# Patient Record
Sex: FEMALE | Race: WHITE | NOT HISPANIC OR LATINO | Employment: OTHER | URBAN - NONMETROPOLITAN AREA
[De-identification: names, ages, dates, MRNs, and addresses within clinical notes are randomized per-mention and may not be internally consistent; named-entity substitution may affect disease eponyms.]

---

## 2018-01-03 ENCOUNTER — APPOINTMENT (OUTPATIENT)
Dept: PHYSICAL THERAPY | Facility: CLINIC | Age: 68
End: 2018-01-03
Payer: COMMERCIAL

## 2018-01-03 PROCEDURE — 97010 HOT OR COLD PACKS THERAPY: CPT

## 2018-01-03 PROCEDURE — 97535 SELF CARE MNGMENT TRAINING: CPT

## 2018-01-03 PROCEDURE — 97140 MANUAL THERAPY 1/> REGIONS: CPT

## 2018-01-03 PROCEDURE — G8978 MOBILITY CURRENT STATUS: HCPCS

## 2018-01-03 PROCEDURE — G8979 MOBILITY GOAL STATUS: HCPCS

## 2018-01-03 PROCEDURE — 97161 PT EVAL LOW COMPLEX 20 MIN: CPT

## 2018-01-03 PROCEDURE — 97110 THERAPEUTIC EXERCISES: CPT

## 2018-01-04 ENCOUNTER — APPOINTMENT (OUTPATIENT)
Dept: PHYSICAL THERAPY | Facility: CLINIC | Age: 68
End: 2018-01-04
Payer: COMMERCIAL

## 2018-01-08 ENCOUNTER — APPOINTMENT (OUTPATIENT)
Dept: PHYSICAL THERAPY | Facility: CLINIC | Age: 68
End: 2018-01-08
Payer: COMMERCIAL

## 2018-01-08 PROCEDURE — 97112 NEUROMUSCULAR REEDUCATION: CPT

## 2018-01-08 PROCEDURE — 97530 THERAPEUTIC ACTIVITIES: CPT

## 2018-01-08 PROCEDURE — 97010 HOT OR COLD PACKS THERAPY: CPT

## 2018-01-08 PROCEDURE — 97140 MANUAL THERAPY 1/> REGIONS: CPT

## 2018-01-08 PROCEDURE — 97110 THERAPEUTIC EXERCISES: CPT

## 2018-01-10 ENCOUNTER — APPOINTMENT (OUTPATIENT)
Dept: PHYSICAL THERAPY | Facility: CLINIC | Age: 68
End: 2018-01-10
Payer: COMMERCIAL

## 2018-01-10 PROCEDURE — 97140 MANUAL THERAPY 1/> REGIONS: CPT

## 2018-01-10 PROCEDURE — 97110 THERAPEUTIC EXERCISES: CPT

## 2018-01-11 ENCOUNTER — APPOINTMENT (OUTPATIENT)
Dept: PHYSICAL THERAPY | Facility: CLINIC | Age: 68
End: 2018-01-11
Payer: COMMERCIAL

## 2018-01-11 PROCEDURE — 97140 MANUAL THERAPY 1/> REGIONS: CPT

## 2018-01-11 PROCEDURE — 97110 THERAPEUTIC EXERCISES: CPT

## 2018-01-15 ENCOUNTER — APPOINTMENT (OUTPATIENT)
Dept: PHYSICAL THERAPY | Facility: CLINIC | Age: 68
End: 2018-01-15
Payer: COMMERCIAL

## 2018-01-15 PROCEDURE — 97140 MANUAL THERAPY 1/> REGIONS: CPT

## 2018-01-15 PROCEDURE — 97110 THERAPEUTIC EXERCISES: CPT

## 2018-01-17 ENCOUNTER — APPOINTMENT (OUTPATIENT)
Dept: PHYSICAL THERAPY | Facility: CLINIC | Age: 68
End: 2018-01-17
Payer: COMMERCIAL

## 2018-01-19 ENCOUNTER — APPOINTMENT (OUTPATIENT)
Dept: PHYSICAL THERAPY | Facility: CLINIC | Age: 68
End: 2018-01-19
Payer: COMMERCIAL

## 2018-01-19 PROCEDURE — 97110 THERAPEUTIC EXERCISES: CPT

## 2018-01-19 PROCEDURE — 97140 MANUAL THERAPY 1/> REGIONS: CPT

## 2018-01-22 ENCOUNTER — APPOINTMENT (OUTPATIENT)
Dept: PHYSICAL THERAPY | Facility: CLINIC | Age: 68
End: 2018-01-22
Payer: COMMERCIAL

## 2018-01-22 PROCEDURE — 97140 MANUAL THERAPY 1/> REGIONS: CPT

## 2018-01-22 PROCEDURE — 97110 THERAPEUTIC EXERCISES: CPT

## 2018-01-24 ENCOUNTER — OFFICE VISIT (OUTPATIENT)
Dept: PHYSICAL THERAPY | Facility: CLINIC | Age: 68
End: 2018-01-24
Payer: COMMERCIAL

## 2018-01-24 DIAGNOSIS — Z96.651 PRESENCE OF RIGHT ARTIFICIAL KNEE JOINT: Primary | ICD-10-CM

## 2018-01-24 PROCEDURE — 97010 HOT OR COLD PACKS THERAPY: CPT

## 2018-01-24 PROCEDURE — 97140 MANUAL THERAPY 1/> REGIONS: CPT

## 2018-01-24 PROCEDURE — 97110 THERAPEUTIC EXERCISES: CPT

## 2018-01-24 NOTE — PROGRESS NOTES
Daily Note     Today's date: 2018  Patient name: Searfin Lindsay  : 1950  MRN: [id number]  Referring provider: Dominic Sosa  Dx:   Encounter Diagnosis   Name Primary?  Presence of right artificial knee joint Yes                  Subjective: Pt reports difficulty donning/doffing socks and shoes  Reports increased pain with attempting to put on shoes and socks  Objective: See treatment diary below      Assessment: Tolerated treatment fair  Patient demonstrated fatigue post treatment and could benefit from continued PT  Pt zenia added sit to stand well with use of B Ue's Pt cont to have notable restriction with rom into kneel flx/ext  Plan: Continue per plan of care       Precautions: 17 date of surgery    Daily Treatment Diary    HEP: Sheet provided and discussed    Manual              ART             IASTM             JM             PROM and LE stretch 15'            STM               Exercise Diary              TM             Bike 10'            NuStep             Standing 1/2 Roll calf stretch ''            Ankle Pumps 3/10            HR/TR 3/10            TB TKE L3 3/10            TB Heel to Toes L2 3/10            Forward/backward heel to toe walk 3/10            Sit to stand 2/10            No shoe AE Steamboats             Bridge with Add squeeze             HS into QS 3/10            Clam shells             SL Sit to Stand             BOSU SLB             Upside down BOSU SL squat holds             TB Lat Walks 3/10            Step ups/downs             Wall Slides 3/10                Modalities              MHP             CP             Stim

## 2018-01-26 ENCOUNTER — OFFICE VISIT (OUTPATIENT)
Dept: PHYSICAL THERAPY | Facility: CLINIC | Age: 68
End: 2018-01-26
Payer: COMMERCIAL

## 2018-01-26 DIAGNOSIS — Z96.651 PRESENCE OF RIGHT ARTIFICIAL KNEE JOINT: Primary | ICD-10-CM

## 2018-01-26 PROCEDURE — 97110 THERAPEUTIC EXERCISES: CPT

## 2018-01-26 PROCEDURE — 97140 MANUAL THERAPY 1/> REGIONS: CPT

## 2018-01-26 NOTE — PROGRESS NOTES
Daily Note     Today's date: 2018  Patient name: Betsey Dominguez  : 1950  MRN: [id number]  Referring provider: Silvestre Carrasco  Dx:   Encounter Diagnosis   Name Primary?  Presence of right artificial knee joint Yes                  Subjective: Pt reports overall improved sx today since beginning of the week  Objective: See treatment diary below      Assessment: Tolerated treatment fair  Patient demonstrated fatigue post treatment  Pt cont to be restricted with knee flx/ext  Pt making slow steady gains toward improved rom however will cont to benefit from tx to address cont rom restrictions  Plan: Continue per plan of care  RC due next week       Precautions: 17 date of surgery     Daily Treatment Diary     HEP: Sheet provided and discussed     Manual   18                     ART                       IASTM                       JM                       PROM and LE stretch 15'                     STM                          Exercise Diary                        TM                       Bike 10'                     NuStep                       Standing 1/2 Roll calf stretch ''                     Ankle Pumps 3/10                     HR/TR 3/10                     TB TKE L3 3/10                     TB Heel to Toes L2 3/10                     Forward/backward heel to toe walk 3/10                     Sit to stand 2/10                     No shoe AE Steamboats                       Bridge with Add squeeze                       HS into QS 3/10                     Clam shells                       SL Sit to Stand                       BOSU SLB                       Upside down BOSU SL squat holds                       TB Lat Walks 3/10                     Step ups/downs                       Wall Slides 3/10                           Modalities                        MHP                       CP  10'                     Stim

## 2018-01-29 ENCOUNTER — OFFICE VISIT (OUTPATIENT)
Dept: PHYSICAL THERAPY | Facility: CLINIC | Age: 68
End: 2018-01-29
Payer: COMMERCIAL

## 2018-01-29 DIAGNOSIS — Z96.651 PRESENCE OF RIGHT ARTIFICIAL KNEE JOINT: Primary | ICD-10-CM

## 2018-01-29 PROCEDURE — G8978 MOBILITY CURRENT STATUS: HCPCS

## 2018-01-29 PROCEDURE — 97140 MANUAL THERAPY 1/> REGIONS: CPT

## 2018-01-29 PROCEDURE — G8978 MOBILITY CURRENT STATUS: HCPCS | Performed by: PHYSICAL THERAPIST

## 2018-01-29 PROCEDURE — G8979 MOBILITY GOAL STATUS: HCPCS | Performed by: PHYSICAL THERAPIST

## 2018-01-29 PROCEDURE — 97110 THERAPEUTIC EXERCISES: CPT

## 2018-01-29 PROCEDURE — G8979 MOBILITY GOAL STATUS: HCPCS

## 2018-01-29 NOTE — PROGRESS NOTES
Daily Note     Today's date: 2018  Patient name: Serafin Lindsay  : 1950  MRN: [id number]  Referring provider: Dominic Sosa  Dx:   Encounter Diagnosis   Name Primary?  Presence of right artificial knee joint Yes                  Subjective: Pt reports she is doing well  Reports she cont to have stiffness with knee ext  Objective: See treatment diary below      Assessment: Tolerated treatment fair  Patient could benefit from continued PT  Pt cont to have restriction with both knee flx/ext as noted with PROM  Pt with mild antalgic gait with ambulation in clinic  Plan: Continue per plan of care  RC due next visit       Precautions: 17 date of surgery     Daily Treatment Diary     HEP: Sheet provided and discussed     Manual   18                     ART                       IASTM                       JM                       PROM and LE stretch 15'                     STM                          Exercise Diary                        TM                       Bike 10'                     NuStep                       Standing 1/2 Roll calf stretch ''                     Ankle Pumps 3/10                     HR/TR 3/10                     TB TKE L3 3/10                     TB Heel to Toes L2 3/10                     Forward/backward heel to toe walk 3/10                     Sit to stand 2/10                     No shoe AE Steamboats                       Bridge with Add squeeze                       HS into QS 3/10                     Clam shells                       SL Sit to Stand                       BOSU SLB                       Upside down BOSU SL squat holds                       TB Lat Walks 3/10                     Step ups/downs                       Wall Slides 3/10                           Modalities                        MHP                       CP  10'                     Stim

## 2018-01-31 ENCOUNTER — OFFICE VISIT (OUTPATIENT)
Dept: PHYSICAL THERAPY | Facility: CLINIC | Age: 68
End: 2018-01-31
Payer: COMMERCIAL

## 2018-01-31 ENCOUNTER — TRANSCRIBE ORDERS (OUTPATIENT)
Dept: PHYSICAL THERAPY | Facility: CLINIC | Age: 68
End: 2018-01-31

## 2018-01-31 DIAGNOSIS — Z96.651 PRESENCE OF RIGHT ARTIFICIAL KNEE JOINT: Primary | ICD-10-CM

## 2018-01-31 PROCEDURE — G8979 MOBILITY GOAL STATUS: HCPCS | Performed by: PHYSICAL THERAPIST

## 2018-01-31 PROCEDURE — 97110 THERAPEUTIC EXERCISES: CPT | Performed by: PHYSICAL THERAPIST

## 2018-01-31 PROCEDURE — 97530 THERAPEUTIC ACTIVITIES: CPT | Performed by: PHYSICAL THERAPIST

## 2018-01-31 PROCEDURE — 97112 NEUROMUSCULAR REEDUCATION: CPT | Performed by: PHYSICAL THERAPIST

## 2018-01-31 PROCEDURE — 97140 MANUAL THERAPY 1/> REGIONS: CPT | Performed by: PHYSICAL THERAPIST

## 2018-01-31 PROCEDURE — G8978 MOBILITY CURRENT STATUS: HCPCS | Performed by: PHYSICAL THERAPIST

## 2018-01-31 NOTE — LETTER
2018    Shonda Chua, [address] [facility] [address]    Patient: Adamaris Elkins   YOB: 1950   Date of Visit: 2018       Dear Charlie Machado PA-C:    Please review the attached summary of Serenity Anaya's progress and our plan for continued therapy, and verify that you agree therapy should continue by signing the attached document and sending it back to our office  If you have any questions or concerns, please don't hesitate to call  Sincerely,      Alonso Pope, PT        Referring Provider:      Based upon review of the patient's progress and continued therapy plan, it is my medical opinion that Rajeev Copeland should continue physical therapy treatment at the Physical Therapy at [address] [place] [address] [place]:                    Shonda Chua PA-C  [address] [facility] [address]: [phone]          PT Re-Evaluation     Today's date: 2018  Patient name: Adamaris Elkins  : 1950  MRN: [id number]  Referring provider: Davis Garg  Dx:   Encounter Diagnosis   Name Primary?     Presence of right artificial knee joint Yes                  Assessment  Understanding of Dx/Px/POC: fair   Prognosis: good    Goals  STGs: To be complete within 3 weeks  - Decrease pain to < 2/10 at worst (partially met)  - Increase AROM to 0-120 degrees (partially met)  - Increase strength to > 4+/5 (partially met)  - Improve gait pattern to WNL for distances < 4 blocks (partially met)    LTGs: To be complete within 4-6 weeks  - Able to walk for > 1 mile without pain or limitation for increased safety and functional capacity with Activities of daily living and work-related duties (partially met)  - Able to squat repetitively without pain or limitation for increased safety and functional capacity with Activities of daily living and work-related duties (partially met)  - Able to repetitively ascend/descend stairs with without pain or limitation for increased safety and functional capacity with Activities of daily living and work-related duties (partially met)   - Able to repetitively complete transfers without pain or limitation for increased safety and functional capacity with Activities of daily living and work-related duties (partially met)    Plan  Frequency: 3x week  Duration in visits: 4     Upon completion of today's re-evaluation, as evidenced by present objective and subjective measures, Zoeys sx remain consistent with continued, slow recovery from being s/p R TKA 17  Pt will benefit from continued skilled physical therapy to address current deficits  Subjective Evaluation    History of Present Illness  Date of surgery: 2017  Pain  Current pain rating: 3  At best pain ratin  At worst pain ratin  Location: R knee       Pt reports the knee continues to have good days and bad days  Overall continues to improve, just wishes it was at a quicker rate  Reports she completes HEP, but some of the exercises are difficult for her to stay consistent with  Continues to see benefit from physical therapy as she can tell she improves each visit she attends  Reports she is anxious to continue making progress  Objective Pain level ranges 0-5/10  AROM: R knee -5 - 90 degrees (PROM -3 - 100 degrees);  L knee 0-120 degrees  R knee strength 4-/5  Gait: Intermittent mild to mod lateral limp with R knee extension lag  Able to walk with 50-75% improved mechanics for < 6 blocks with mild to mod pain and limitation  Able to squat at 50% capacity with mild to mod pain and limitation  Able to complete transfers > 50% of the time with mild to no pain and limitation  Able to ascend/descend a full flight of stairs at > 50% capacity with mild to no pain and limitation    Precautions: DOS: 17     Daily Treatment Diary     HEP: Sheet provided and discussed     Manual   18                     ART                       IASTM                       JM                       PROM and LE stretch x20'                     STM                          Exercise Diary   1/31/18                     TM                       Bike 10'                     NuStep                       Standing 1/2 Roll calf stretch 6/20''                     Ankle Pumps 3/10                     HR/TR 3/10                     TB TKE L3 3/10                     TB Heel to Toes L2 3/10                     Forward/backward heel to toe walk 3/10                     Sit to stand 2/10                     No shoe AE Steamboats                       Bridge with Add squeeze                       HS into QS 3/10                     Clam shells                       SL Sit to Stand                       BOSU SLB                       Upside down BOSU SL squat holds                       TB Lat Walks 3/10                     Step ups/downs                       Wall Slides 3/10                           Modalities   1/31/18                     MHP                       CP  10'                     Stim

## 2018-01-31 NOTE — LETTER
2018    Gilson Espinoza, [address] [address]    Patient: Ginny Hollis   YOB: 1950   Date of Visit: 2018       Dear Jas Torres PA-C:    Please review the attached summary of Serenity Anaya's progress and our plan for continued therapy, and verify that you agree therapy should continue by signing the attached document and sending it back to our office  If you have any questions or concerns, please don't hesitate to call  Sincerely,      Kacie Fitch, PT        Referring Provider:      Based upon review of the patient's progress and continued therapy plan, it is my medical opinion that Erendira Mills should continue physical therapy treatment at the Physical Therapy at [address] [place] [address] [place]:                    Gilson Espinoza PA-C  [address] [facility] [address]: [phone]          PT Re-Evaluation     Today's date: 2018  Patient name: Ginny Hollis  : 1950  MRN: [id number]  Referring provider: Lauren Gupta  Dx:   Encounter Diagnosis   Name Primary?     Presence of right artificial knee joint Yes                  Assessment  Understanding of Dx/Px/POC: fair   Prognosis: good    Goals  STGs: To be complete within 3 weeks  - Decrease pain to < 2/10 at worst (partially met)  - Increase AROM to 0-120 degrees (partially met)  - Increase strength to > 4+/5 (partially met)  - Improve gait pattern to WNL for distances < 4 blocks (partially met)    LTGs: To be complete within 4-6 weeks  - Able to walk for > 1 mile without pain or limitation for increased safety and functional capacity with Activities of daily living and work-related duties (partially met)  - Able to squat repetitively without pain or limitation for increased safety and functional capacity with Activities of daily living and work-related duties (partially met)  - Able to repetitively ascend/descend stairs with without pain or limitation for increased safety and functional capacity with Activities of daily living and work-related duties (partially met)   - Able to repetitively complete transfers without pain or limitation for increased safety and functional capacity with Activities of daily living and work-related duties (partially met)    Plan  Frequency: 3x week  Duration in visits: 4     Upon completion of today's re-evaluation, as evidenced by present objective and subjective measures, Zoeys sx remain consistent with continued, slow recovery from being s/p R TKA 17  Pt will benefit from continued skilled physical therapy to address current deficits  Subjective Evaluation    History of Present Illness  Date of surgery: 2017  Pain  Current pain rating: 3  At best pain ratin  At worst pain ratin  Location: R knee       Pt reports the knee continues to have good days and bad days  Overall continues to improve, just wishes it was at a quicker rate  Reports she completes HEP, but some of the exercises are difficult for her to stay consistent with  Continues to see benefit from physical therapy as she can tell she improves each visit she attends  Reports she is anxious to continue making progress  Objective Pain level ranges 0-5/10  AROM: R knee -5 - 90 degrees (PROM -3 - 100 degrees);  L knee 0-120 degrees  R knee strength 4-/5  Gait: Intermittent mild to mod lateral limp with R knee extension lag  Able to walk with 50-75% improved mechanics for < 6 blocks with mild to mod pain and limitation  Able to squat at 50% capacity with mild to mod pain and limitation  Able to complete transfers > 50% of the time with mild to no pain and limitation  Able to ascend/descend a full flight of stairs at > 50% capacity with mild to no pain and limitation    Precautions: DOS: 17     Daily Treatment Diary     HEP: Sheet provided and discussed     Manual   18                     ART                       IASTM                       JM                       PROM and LE stretch x20'                     STM                          Exercise Diary   1/31/18                     TM                       Bike 10'                     NuStep                       Standing 1/2 Roll calf stretch 6/20''                     Ankle Pumps 3/10                     HR/TR 3/10                     TB TKE L3 3/10                     TB Heel to Toes L2 3/10                     Forward/backward heel to toe walk 3/10                     Sit to stand 2/10                     No shoe AE Steamboats                       Bridge with Add squeeze                       HS into QS 3/10                     Clam shells                       SL Sit to Stand                       BOSU SLB                       Upside down BOSU SL squat holds                       TB Lat Walks 3/10                     Step ups/downs                       Wall Slides 3/10                           Modalities   1/31/18                     MHP                       CP  10'                     Stim

## 2018-01-31 NOTE — PROGRESS NOTES
PT Re-Evaluation     Today's date: 2018  Patient name: Patricia Suarez  : 1950  MRN: [id number]  Referring provider: Wallace Burdick  Dx:   Encounter Diagnosis   Name Primary?  Presence of right artificial knee joint Yes                  Assessment  Understanding of Dx/Px/POC: fair   Prognosis: good    Goals  STGs: To be complete within 3 weeks  - Decrease pain to < 2/10 at worst (partially met)  - Increase AROM to 0-120 degrees (partially met)  - Increase strength to > 4+/5 (partially met)  - Improve gait pattern to WNL for distances < 4 blocks (partially met)    LTGs: To be complete within 4-6 weeks  - Able to walk for > 1 mile without pain or limitation for increased safety and functional capacity with Activities of daily living and work-related duties (partially met)  - Able to squat repetitively without pain or limitation for increased safety and functional capacity with Activities of daily living and work-related duties (partially met)  - Able to repetitively ascend/descend stairs with without pain or limitation for increased safety and functional capacity with Activities of daily living and work-related duties (partially met)   - Able to repetitively complete transfers without pain or limitation for increased safety and functional capacity with Activities of daily living and work-related duties (partially met)    Plan  Frequency: 3x week  Duration in visits: 4     Upon completion of today's re-evaluation, as evidenced by present objective and subjective measures, Serenity's sx remain consistent with continued, slow recovery from being s/p R TKA 17  Pt will benefit from continued skilled physical therapy to address current deficits  Subjective Evaluation    History of Present Illness  Date of surgery: 2017  Pain  Current pain rating: 3  At best pain ratin  At worst pain ratin  Location: R knee       Pt reports the knee continues to have good days and bad days  Overall continues to improve, just wishes it was at a quicker rate  Reports she completes HEP, but some of the exercises are difficult for her to stay consistent with  Continues to see benefit from physical therapy as she can tell she improves each visit she attends  Reports she is anxious to continue making progress  Objective Pain level ranges 0-5/10  AROM: R knee -5 - 90 degrees (PROM -3 - 100 degrees);  L knee 0-120 degrees  R knee strength 4-/5  Gait: Intermittent mild to mod lateral limp with R knee extension lag  Able to walk with 50-75% improved mechanics for < 6 blocks with mild to mod pain and limitation  Able to squat at 50% capacity with mild to mod pain and limitation  Able to complete transfers > 50% of the time with mild to no pain and limitation  Able to ascend/descend a full flight of stairs at > 50% capacity with mild to no pain and limitation    Precautions: DOS: 12/28/17     Daily Treatment Diary     HEP: Sheet provided and discussed     Manual   1/29/18                     ART                       IASTM                       JM                       PROM and LE stretch x20'                     STM                          Exercise Diary   1/29/18                     TM                       Bike 10'                     NuStep                       Standing 1/2 Roll calf stretch 6/20''                     Ankle Pumps 3/10                     HR/TR 3/10                     TB TKE L3 3/10                     TB Heel to Toes L2 3/10                     Forward/backward heel to toe walk 3/10                     Sit to stand 2/10                     No shoe AE Steamboats                       Bridge with Add squeeze                       HS into QS 3/10                     Clam shells                       SL Sit to Stand                       BOSU SLB                       Upside down BOSU SL squat holds                       TB Lat Walks 3/10                     Step ups/downs                     Wall Slides 3/10                           Modalities   1/29/18                     LINDSEY                       CP  10'                     Stim

## 2018-02-06 ENCOUNTER — OFFICE VISIT (OUTPATIENT)
Dept: PHYSICAL THERAPY | Facility: CLINIC | Age: 68
End: 2018-02-06
Payer: COMMERCIAL

## 2018-02-06 DIAGNOSIS — Z96.651 PRESENCE OF RIGHT ARTIFICIAL KNEE JOINT: Primary | ICD-10-CM

## 2018-02-06 PROCEDURE — 97110 THERAPEUTIC EXERCISES: CPT

## 2018-02-06 PROCEDURE — 97140 MANUAL THERAPY 1/> REGIONS: CPT

## 2018-02-06 NOTE — PROGRESS NOTES
Daily Note     Today's date: 2018  Patient name: Dawood Gaxiola  : 1950  MRN: [id number]  Referring provider: Jesica Nova  Dx:   Encounter Diagnosis   Name Primary?  Presence of right artificial knee joint Yes                  Subjective: Pt reports she is seeing surgeon tomorrow  Reports doing HEP  Objective: See treatment diary below  100 degrees of knee flx AAROM  Assessment: Tolerated treatment fair  Patient exhibited good technique with therapeutic exercises  Pt able to complete increased reps with sit to stand and completed lateral walking with resistance today  Pt zenia progressions well with only fatigue  Plan: Continue per plan of care         Precautions: 17 date of surgery     Daily Treatment Diary     HEP: Sheet provided and discussed     Manual   18                     ART                       IASTM                       JM                       PROM and LE stretch 15'                     STM                          Exercise Diary                        TM                       Bike 10'                     NuStep                       Standing 1/ Roll calf stretch ''                     Ankle Pumps 3/10                     HR/TR 3/10                     TB TKE L3 3/10                     TB Heel to Toes L2 3/10                     Forward/backward heel to toe walk 3/10                     Sit to stand 3/10                     No shoe AE Steamboats                       Bridge with Add squeeze                       HS into QS 3/10                     Clam shells                       SL Sit to Stand                       BOSU SLB                       Upside down BOSU SL squat holds                       TB Lat Walks 3/10 L2                     Step ups/downs                       Wall Slides 3/10                           Modalities                        MHP                       CP  10'                     Stim

## 2018-02-09 ENCOUNTER — OFFICE VISIT (OUTPATIENT)
Dept: PHYSICAL THERAPY | Facility: CLINIC | Age: 68
End: 2018-02-09
Payer: COMMERCIAL

## 2018-02-09 DIAGNOSIS — Z96.651 PRESENCE OF RIGHT ARTIFICIAL KNEE JOINT: Primary | ICD-10-CM

## 2018-02-09 PROCEDURE — 97140 MANUAL THERAPY 1/> REGIONS: CPT | Performed by: PHYSICAL THERAPIST

## 2018-02-09 PROCEDURE — 97110 THERAPEUTIC EXERCISES: CPT | Performed by: PHYSICAL THERAPIST

## 2018-02-09 PROCEDURE — 97112 NEUROMUSCULAR REEDUCATION: CPT | Performed by: PHYSICAL THERAPIST

## 2018-02-09 PROCEDURE — 97530 THERAPEUTIC ACTIVITIES: CPT | Performed by: PHYSICAL THERAPIST

## 2018-02-09 NOTE — PROGRESS NOTES
Daily Note     Today's date: 2018  Patient name: Lashawn Enamorado  : 1950  MRN: [id number]  Referring provider: Maynor Andrade  Dx:   Encounter Diagnosis   Name Primary?  Presence of right artificial knee joint Yes                  Subjective: Pt reports she is seeing surgeon tomorrow  Reports doing HEP       Objective: See treatment diary below         Assessment: Tolerated treatment fair  Patient exhibited good technique with therapeutic exercises  Knee ext lag remains, but improving with gait  Will continue to monitor and progress as indicated          Plan: Continue per plan of care         Precautions: DOS: 17     Daily Treatment Diary     HEP: Sheet provided and discussed     Manual   18                     ART                       IASTM                       JM                       PROM and LE stretch x20'                     STM                          Exercise Diary   18                     TM                       Bike 10'                     NuStep                       Standing 1/2 Roll calf stretch ''                     Ankle Pumps 3/10                     HR/TR 3/10                     TB TKE L3 3/10                     TB Heel to Toes L2 3/10                     Forward/backward heel to toe walk 3/10                     Sit to stand 210                     No shoe AE Steamboats                       Bridge with Add squeeze                       HS into QS 3/10                     Clam shells                       SL Sit to Stand                       BOSU SLB                       Upside down BOSU SL squat holds                       TB Lat Walks 3/10                     Step ups/downs                       Wall Slides 3/10                           Modalities   18                     MHP                       CP  10'                     Stim

## 2018-02-12 ENCOUNTER — APPOINTMENT (OUTPATIENT)
Dept: PHYSICAL THERAPY | Facility: CLINIC | Age: 68
End: 2018-02-12
Payer: COMMERCIAL

## 2018-02-13 ENCOUNTER — OFFICE VISIT (OUTPATIENT)
Dept: PHYSICAL THERAPY | Facility: CLINIC | Age: 68
End: 2018-02-13
Payer: COMMERCIAL

## 2018-02-13 DIAGNOSIS — Z96.651 PRESENCE OF RIGHT ARTIFICIAL KNEE JOINT: Primary | ICD-10-CM

## 2018-02-13 PROCEDURE — 97140 MANUAL THERAPY 1/> REGIONS: CPT

## 2018-02-13 PROCEDURE — 97010 HOT OR COLD PACKS THERAPY: CPT

## 2018-02-13 PROCEDURE — 97110 THERAPEUTIC EXERCISES: CPT

## 2018-02-13 NOTE — PROGRESS NOTES
Daily Note     Today's date: 2018  Patient name: Rupa Peña  : 1950  MRN: [id number]  Referring provider: Tina Jennings  Dx:   Encounter Diagnosis   Name Primary?  Presence of right artificial knee joint Yes                  Subjective: Patient reports she has slight soreness in her R knee upon arrival to therapy today, she reports her B knees have an increase in symptoms intermittently  Objective: See treatment diary below  Incorporated leg press, hs curl machine, step ups, and increased TB resistance on TE  Assessment: Tolerated treatment well  Patient demonstrated fatigue post treatment  No change in pain t/o session      Plan: Continue per plan of care         Precautions: DOS: 17     Daily Treatment Diary     HEP: Sheet provided and discussed     Manual   18  2-13                   ART                       IASTM                       JM                       PROM and LE stretch x20'  10'                   STM                          Exercise Diary   18                    TM                       Bike 10'  10'                   HA curl machine S: 7    PL 4  3x10                   Standing 1/2 Roll calf stretch ''  6x20"                   Ankle Pumps 3/10                     HR/TR 3/10  3/10                   TB TKE L3 3/10  L4 3/10                   TB Heel to Toes L2 3/10  l4  3x10                   Forward/backward heel to toe walk 3/10  NP                   Sit to stand 2/10  3x10                   No shoe AE Steamboats                       Leg press                       HS into QS 3/10                     Clam shells                       SL Sit to Stand                       BOSU SLB                       Upside down BOSU SL squat holds                       TB Lat Walks 3/10  l4 4x//bar                   Step ups F/L    8"  2x10                   Wall Slides 3/10  2x10                         Modalities   18                     MHP                       CP  10'  10'                   Stim

## 2018-02-14 ENCOUNTER — OFFICE VISIT (OUTPATIENT)
Dept: PHYSICAL THERAPY | Facility: CLINIC | Age: 68
End: 2018-02-14
Payer: COMMERCIAL

## 2018-02-14 DIAGNOSIS — Z96.651 PRESENCE OF RIGHT ARTIFICIAL KNEE JOINT: Primary | ICD-10-CM

## 2018-02-14 PROCEDURE — 97140 MANUAL THERAPY 1/> REGIONS: CPT

## 2018-02-14 PROCEDURE — 97110 THERAPEUTIC EXERCISES: CPT

## 2018-02-14 NOTE — PROGRESS NOTES
Daily Note     Today's date: 2018  Patient name: Adamaris Elkins  : 1950  MRN: [id number]  Referring provider: Param Lewis PA-C  Dx: No diagnosis found  Subjective: Pt reports doing well after progressions last visit  Reports min increased soreness  Objective: See treatment diary below      Assessment: Tolerated treatment well  Patient would benefit from continued PT  Pt demonstrates 110 degrees of knee flx PROM  Pt cont to demonstrate slow steady progression towards goals  Plan: Continue per plan of care        Precautions: 17 date of surgery     Daily Treatment Diary     HEP: Sheet provided and discussed     Manual   18                     ART                       IASTM                       JM                       PROM and LE stretch 15'                     STM                          Exercise Diary                        TM                       Bike 10'                     NuStep                       Standing 1/2 Roll calf stretch ''                     Ankle Pumps 3/10                     HR/TR 3/10                     TB TKE L43/10                     TB Heel to Toes L4 3/10                     Forward/backward heel to toe walk 3/10                     Sit to stand 3/10                     No shoe AE Steamboats                       Bridge with Add squeeze                       HS into QS 3/10                     Clam shells                       HS curl machine 5pl 2/10                     Leg Press 3pl 2/10                     Upside down BOSU SL squat holds                       TB Lat Walks 3/10 L4                     Step ups fwd/lat  8" 3/10                     Wall Slides 3/10                           Modalities                        MHP                       CP  10'                     Stim

## 2018-02-16 ENCOUNTER — OFFICE VISIT (OUTPATIENT)
Dept: PHYSICAL THERAPY | Facility: CLINIC | Age: 68
End: 2018-02-16
Payer: COMMERCIAL

## 2018-02-16 DIAGNOSIS — Z96.651 PRESENCE OF RIGHT ARTIFICIAL KNEE JOINT: Primary | ICD-10-CM

## 2018-02-16 PROCEDURE — 97140 MANUAL THERAPY 1/> REGIONS: CPT

## 2018-02-16 PROCEDURE — 97110 THERAPEUTIC EXERCISES: CPT

## 2018-02-16 NOTE — PROGRESS NOTES
Daily Note     Today's date: 2018  Patient name: Lisa Latif  : 1950  MRN: [id number]  Referring provider: Haley Allen  Dx:   Encounter Diagnosis   Name Primary?  Presence of right artificial knee joint Yes                  Subjective: Pt reports she is doing well but has increased soreness in B knees today  Reports completing HEP and working on knee rom  Objective: See treatment diary below      Assessment: Tolerated treatment well  Patient demonstrated fatigue post treatment  Pt with increased c/o pain with knee PROM into knee ext/flx today  Pt zenia program well with overall fatigue  Pt will cont to benefit from tx to address rom deficits  Plan: Continue per plan of care       Precautions: 17 date of surgery     Daily Treatment Diary     HEP: Sheet provided and discussed     Manual   18                     ART                       IASTM                       JM                       PROM and LE stretch 15'                     STM                          Exercise Diary                        TM                       Bike 10'                     NuStep                       Standing 1/2 Roll calf stretch ''                     Ankle Pumps 3/10                     HR/TR 3/10                     TB TKE L43/10                     TB Heel to Toes L4 3/10                     Forward/backward heel to toe walk 3/10                     Sit to stand 3/10                     No shoe AE Steamboats                       Bridge with Add squeeze                       HS into QS 3/10                     Clam shells                       HS curl machine 5pl 3/10                     Leg Press 3pl 3/10                     Upside down BOSU SL squat holds                       TB Lat Walks 3/10 L4                     Step ups fwd/lat  8" 3/10                     Wall Slides 3/10                           Modalities                        MHP                       CP  10'                     Stim

## 2018-02-19 ENCOUNTER — OFFICE VISIT (OUTPATIENT)
Dept: PHYSICAL THERAPY | Facility: CLINIC | Age: 68
End: 2018-02-19
Payer: COMMERCIAL

## 2018-02-19 DIAGNOSIS — Z96.651 PRESENCE OF RIGHT ARTIFICIAL KNEE JOINT: Primary | ICD-10-CM

## 2018-02-19 PROCEDURE — 97110 THERAPEUTIC EXERCISES: CPT

## 2018-02-19 PROCEDURE — 97140 MANUAL THERAPY 1/> REGIONS: CPT

## 2018-02-19 NOTE — PROGRESS NOTES
Daily Note     Today's date: 2018  Patient name: Holly Fierro  : 1950  MRN: [id number]  Referring provider: Rafael Ramirez  Dx:   Encounter Diagnosis     ICD-10-CM    1  Presence of right artificial knee joint Z96 651                   Subjective: Pt reports stiffness today  Pt reports she is pleased with progress and will see MD for f/u on Wednesday  Objective: See treatment diary below      Assessment: Tolerated treatment well  Patient exhibited good technique with therapeutic exercises  Pt cont to demonstrate near 110 degrees of knee flx AAROM  Pt zenia program well with min increased pain and primarily stiffness  Plan: Continue per plan of care          Precautions: 17 date of surgery     Daily Treatment Diary     HEP: Sheet provided and discussed     Manual   18                     ART                       IASTM                       JM                       PROM and LE stretch 15'                     STM                          Exercise Diary                        TM                       Bike 10'                     NuStep                       Standing 1/2 Roll calf stretch ''                     Ankle Pumps 3/10                     HR/TR 3/10                     TB TKE L43/10                     TB Heel to Toes L4 3/10                     Forward/backward heel to toe walk 3/10                     Sit to stand 3/10                     No shoe AE Steamboats                       Bridge with Add squeeze                       HS into QS 3/10                     Clam shells                       HS curl machine 5pl 3/10                     Leg Press 3pl 3/10                     Upside down BOSU SL squat holds                       TB Lat Walks 3/10 L4                     Step ups fwd/lat  8" 3/10                     Wall Slides 3/10                           Modalities                        MHP                       CP  10'                     Stim

## 2018-02-20 ENCOUNTER — OFFICE VISIT (OUTPATIENT)
Dept: PHYSICAL THERAPY | Facility: CLINIC | Age: 68
End: 2018-02-20
Payer: COMMERCIAL

## 2018-02-20 DIAGNOSIS — Z96.651 PRESENCE OF RIGHT ARTIFICIAL KNEE JOINT: Primary | ICD-10-CM

## 2018-02-20 PROCEDURE — 97140 MANUAL THERAPY 1/> REGIONS: CPT

## 2018-02-20 PROCEDURE — 97110 THERAPEUTIC EXERCISES: CPT

## 2018-02-20 NOTE — PROGRESS NOTES
Daily Note     Today's date: 2018  Patient name: Alden Gaitan  : 1950  MRN: [id number]  Referring provider: Vida Elkins  Dx:   Encounter Diagnosis     ICD-10-CM    1  Presence of right artificial knee joint Z96 651                   Subjective: Pt reports she is doing well and will see MD tomorrow  Pleased with progress of ROM  Objective: See treatment diary below  110 degrees R knee flx ROM  - 2 degrees of knee ext rom  Assessment: Tolerated treatment well  Patient exhibited good technique with therapeutic exercises  Pt cont to progress as she demonstrates improved rom with both knee flx/ext  Pt progressing toward goals  Plan: Continue per plan of care       Precautions: 17 date of surgery     Daily Treatment Diary     HEP: Sheet provided and discussed     Manual   18                   ART                       IASTM                       JM                       PROM and LE stretch 15'  15'                   STM                          Exercise Diary                        TM                       Bike 10'  10'                   NuStep                       Standing 1/ Roll calf stretch ''  "                   Ankle Pumps 3/10  3/10                   HR/TR 3/10  3/10                   TB TKE L43/10 L4 3/10                   TB Heel to Toes L4 3/10  l4 3/10                   Forward/backward heel to toe walk 3/10  3/10                   Sit to stand 3/10  310                   No shoe AE Steamboats                       Bridge with Add squeeze                       HS into QS 3/10  3/10                   Clam shells                       HS curl machine 5pl 3/10  5pl 3/10                   Leg Press 3pl 3/10  3pl 3/10                   Upside down BOSU SL squat holds                       TB Lat Walks 3/10 L4  L4 3/10                   Step ups fwd/lat  8" 310  8" 3/10                   Wall Slides 3/10                         Modalities                        MHP                       CP  10'  10'                   Stim

## 2018-02-23 ENCOUNTER — OFFICE VISIT (OUTPATIENT)
Dept: PHYSICAL THERAPY | Facility: CLINIC | Age: 68
End: 2018-02-23
Payer: COMMERCIAL

## 2018-02-23 DIAGNOSIS — Z96.651 PRESENCE OF RIGHT ARTIFICIAL KNEE JOINT: Primary | ICD-10-CM

## 2018-02-23 PROCEDURE — 97140 MANUAL THERAPY 1/> REGIONS: CPT

## 2018-02-23 PROCEDURE — 97110 THERAPEUTIC EXERCISES: CPT

## 2018-02-23 NOTE — PROGRESS NOTES
Daily Note     Today's date: 2018  Patient name: Roseann Xiong  : 1950  MRN: [id number]  Referring provider: Jaquan Murray  Dx:   Encounter Diagnosis     ICD-10-CM    1  Presence of right artificial knee joint Z96 651                   Subjective: Pt reports straining a muscle in her groin last night  Reports she is ok  States seeing MD and will cont PT and progress rom to 120 degrees of knee flx  Objective: See treatment diary below      Assessment: Tolerated treatment well  Patient would benefit from continued PT  Pt was limited with sit to stands secondary to groin pain today  Pt zenia remainder of exercises without c/o pain  Pt cont to make gains toward goals with improved rom and strength  Pt zenia added SLS steamboats well with fair demonstration of balance with min unsteadiness  Plan: Continue per plan of care          Precautions: 17 date of surgery     Daily Treatment Diary     HEP: Sheet provided and discussed     Manual   18                 ART                       IASTM                       JM                       PROM and LE stretch 15'  15' 15'                 STM                          Exercise Diary       18                 TM                       Bike 8'  10'  10'                 NuStep                       Standing / Roll calf stretch ''  "  620"                 Ankle Pumps 3/10  3/10  3/10                 HR/TR 3/10  3/10  3/10                 TB TKE L43/10 L4 3/10  L 4 3/10                 TB Heel to Toes L4 3/10  l4 3/10  L 4 3/10                 Forward/backward heel to toe walk 3/10  3/10 3/10                 Sit to stand 3/10  3/10  3/10                  AE Steamboats      2/10                 Bridge with Add squeeze                       HS into QS 3/10  3/10  3/10                 Clam shells                       HS curl machine 5pl 3/10  5pl 3/10  5pl 3/10                 Leg Press 3pl 3/10  3pl 3/10  3pl 3/10                 Upside down BOSU SL squat holds                       TB Lat Walks 3/10 L4  L4 3/10  L 4 3/10                 Step ups fwd/lat  8" 3/10  8" 3/10 8" 3/10                 Wall Slides 3/10                           Modalities       2/23/18                 MHP                       CP  10'  10'  10'                 Stim

## 2018-02-27 ENCOUNTER — OFFICE VISIT (OUTPATIENT)
Dept: PHYSICAL THERAPY | Facility: CLINIC | Age: 68
End: 2018-02-27
Payer: COMMERCIAL

## 2018-02-27 DIAGNOSIS — Z96.651 PRESENCE OF RIGHT ARTIFICIAL KNEE JOINT: Primary | ICD-10-CM

## 2018-02-27 PROCEDURE — 97140 MANUAL THERAPY 1/> REGIONS: CPT

## 2018-02-27 PROCEDURE — 97110 THERAPEUTIC EXERCISES: CPT

## 2018-02-27 NOTE — PROGRESS NOTES
Daily Note     Today's date: 2018  Patient name: Christian Parr  : 1950  MRN: [id number]  Referring provider: Adriana Cedillo  Dx:   Encounter Diagnosis     ICD-10-CM    1  Presence of right artificial knee joint Z96 651                   Subjective: Pt reports she is doing well however c/o L knee pain from past TKA  Reports feeling like leg press machine is causing pain  Pt reports no increased pain in R knee  Objective: See treatment diary below      Assessment: Tolerated treatment well  Patient demonstrated fatigue post treatment  Pt able to complete program with cont progression of rom demonstrating 110 degrees of knee flx arom and 0 degrees of knee ext  Pt held leg press as she had discomfort along L knee  Plan: Continue per plan of care  Progress note during next visit       Precautions: 17 date of surgery     Daily Treatment Diary     HEP: Sheet provided and discussed     Manual   18               ART                       IASTM                       JM                       PROM and LE stretch 13'  15' 15'  15'               STM                          Exercise Diary       18               TM                       Bike 10'  10'  10'  10'               NuStep                       Standing 1/ Roll calf stretch ''  "  " "               Ankle Pumps 3/10  3/10  3/10  3/10               HR/TR 3/10  3/10  3/10  3/10               TB TKE L43/10 L4 3/10  L 4 3/10  3/10 L4               TB Heel to Toes L4 3/10  l4 3/10  L 4 3/10  3/10 L4               Forward/backward heel to toe walk 3/10  3/10 3/10  3/10               Sit to stand 3/10  3/10  3/10  3/10                AE Steamboats      2/10  3/10               Bridge with Add squeeze                       HS into QS 3/10  3/10  3/10  3/10               Clam shells                       HS curl machine 5pl 3/10  5pl 3/10  5pl 3/10  5pl 3/10               Leg Press 3pl 3/10  3pl 3/10  3pl 3/10  hold pain in L knee               Upside down BOSU SL squat holds                       TB Lat Walks 3/10 L4  L4 3/10  L 4 3/10  3/10 L4               Step ups fwd/lat  8" 3/10  8" 3/10 8" 3/10  8" 3/10               Wall Slides 3/10                           Modalities       2/23/18 2/27/18               MHP                       CP  10'  10'  10'  10'               Stim

## 2018-02-28 ENCOUNTER — OFFICE VISIT (OUTPATIENT)
Dept: PHYSICAL THERAPY | Facility: CLINIC | Age: 68
End: 2018-02-28
Payer: COMMERCIAL

## 2018-02-28 DIAGNOSIS — Z96.651 PRESENCE OF RIGHT ARTIFICIAL KNEE JOINT: Primary | ICD-10-CM

## 2018-02-28 PROCEDURE — 97110 THERAPEUTIC EXERCISES: CPT

## 2018-02-28 PROCEDURE — 97140 MANUAL THERAPY 1/> REGIONS: CPT

## 2018-02-28 PROCEDURE — G8978 MOBILITY CURRENT STATUS: HCPCS

## 2018-02-28 PROCEDURE — G8979 MOBILITY GOAL STATUS: HCPCS

## 2018-02-28 NOTE — PROGRESS NOTES
Daily Note     Today's date: 3/5/2018  Patient name: Lisa Latif  : 1950  MRN: [id number]  Referring provider: Haley Allen  Dx:   Encounter Diagnosis     ICD-10-CM    1  Presence of right artificial knee joint Z96 651                   Subjective: Pt reports increased stiffness and discomfort today in R knee  Objective: See treatment diary below      Assessment: Tolerated treatment well  Patient demonstrated fatigue post treatment  Pt cont to demonstrate improved zenia to exercises as she demonstrates good zenia to added fwd step downs on 6" step  Pt able to complete with fair zenia and good eccentric control  Plan: Continue per plan of care       Precautions: 17 date of surgery     Daily Treatment Diary     HEP: Sheet provided and discussed     Manual   2/19/18  2/20/18  2/23/18  2/27/18  2/28/18  3/5/18           ART                       IASTM                       JM                       PROM and LE stretch 15'  15' 15'  15'  15'  x15'           STM                          Exercise Diary       2/23/18  2/27/18  2/28/18  3/5/18           TM                       Bike 10'  10'  10'  10'  10'  10'           NuStep                       Standing 1/2 Roll calf stretch ''  "  6/20" 6/20"  620"  6x20''           Ankle Pumps 3/10  3/10  3/10  3/10 3/10  3x10           HR/TR 3/10  3/10  3/10  3/10  3/10  3x10           TB TKE L43/10 L4 3/10  L 4 3/10  3/10 L4  3/10 L4  3x10 L4           TB Heel to Toes L4 3/10  l4 3/10  L 4 3/10  3/10 L4  3/10 L4  3x10 L4           Forward/backward heel to toe walk 3/10  3/10 3/10  3/10  3/10  3x10           Sit to stand 3/10  3/10  3/10  3/10 3/10  3x10            AE Steamboats      2/10  3/10  3/10  3x10           Bridge with Add squeeze                       HS into QS 3/10  3/10  3/10  3/10  3/10  3x10           Clam shells                       HS curl machine 5pl 3/10  5pl 3/10  5pl 3/10  5pl 3/10  5Pl 3/10  5Pl 3x10           Leg Press 3pl 3/10  3pl 3/10  3pl 3/10  hold pain in L knee [employer]           Fwd step downs          6"   3/10  6" 3x10           TB Lat Walks 3/10 L4  L4 3/10  L 4 3/10  3/10 L4  3/10 L4  3x10 L4           Step ups fwd/lat  8" 3/10  8" 3/10 8" 3/10  8" 3/10  8" 3/10  8"           Wall Slides 3/10                           Modalities       2/23/18  2/27/18  2/28/18  3/5/18           MHP                       CP  10'  10'  10'  10'  10'  10'           Stim

## 2018-03-02 ENCOUNTER — APPOINTMENT (OUTPATIENT)
Dept: PHYSICAL THERAPY | Facility: CLINIC | Age: 68
End: 2018-03-02
Payer: COMMERCIAL

## 2018-03-05 ENCOUNTER — OFFICE VISIT (OUTPATIENT)
Dept: PHYSICAL THERAPY | Facility: CLINIC | Age: 68
End: 2018-03-05
Payer: COMMERCIAL

## 2018-03-05 DIAGNOSIS — Z96.651 PRESENCE OF RIGHT ARTIFICIAL KNEE JOINT: Primary | ICD-10-CM

## 2018-03-05 PROCEDURE — 97112 NEUROMUSCULAR REEDUCATION: CPT | Performed by: PHYSICAL THERAPIST

## 2018-03-05 PROCEDURE — 97140 MANUAL THERAPY 1/> REGIONS: CPT | Performed by: PHYSICAL THERAPIST

## 2018-03-05 PROCEDURE — 97530 THERAPEUTIC ACTIVITIES: CPT | Performed by: PHYSICAL THERAPIST

## 2018-03-05 PROCEDURE — 97110 THERAPEUTIC EXERCISES: CPT | Performed by: PHYSICAL THERAPIST

## 2018-03-05 PROCEDURE — G8978 MOBILITY CURRENT STATUS: HCPCS | Performed by: PHYSICAL THERAPIST

## 2018-03-05 PROCEDURE — G8979 MOBILITY GOAL STATUS: HCPCS | Performed by: PHYSICAL THERAPIST

## 2018-03-05 NOTE — LETTER
2018    Soco Lao, [address] [facility] [address]    Patient: Lashawn Jolly   YOB: 1950   Date of Visit: 3/5/2018     Encounter Diagnosis     ICD-10-CM    1  Presence of right artificial knee joint Z96 651        Dear Raquel Hanson PA-C:    Please review the attached Plan of Care from [facility] recent visit  Please verify that you agree therapy should continue by signing the attached document and sending it back to our office  If you have any questions or concerns, please don't hesitate to call  Sincerely,    Myrtle Urbano, Pt, DPT, ATC, ART      Referring Provider:      I certify that I have read the below Plan of Care and certify the need for these services furnished under this plan of treatment while under my care  Soco Lao PA-C  [address] [facility] [address]: [phone]          PT Re-Evaluation     Today's date: 3/5/2018  Patient name: Lashawn Jolly  : 1950  MRN: [id number]  Referring provider: Oseas Molina  Dx:   Encounter Diagnosis   Name Primary?     Presence of right artificial knee joint Yes                  Assessment  Understanding of Dx/Px/POC: fair and good   Prognosis: good    Goals  STGs: To be complete within 2-3 weeks  - Decrease pain to < 2/10 at worst (partially met)  - Increase AROM to 0-120 degrees (partially met)  - Increase strength to > 5/5 (partially met)  - Improve gait pattern to WNL for distances < 1 mile (partially met)    LTGs: To be complete within 3-4 weeks  - Able to walk for > 1 mile without pain or limitation for increased safety and functional capacity with Activities of daily living and work-related duties (partially met)  - Able to squat repetitively without pain or limitation for increased safety and functional capacity with Activities of daily living and work-related duties (partially met)  - Able to repetitively ascend/descend stairs with without pain or limitation for increased safety and functional capacity with Activities of daily living and work-related duties (partially met)   - Able to repetitively complete transfers without pain or limitation for increased safety and functional capacity with Activities of daily living and work-related duties (partially met)    Plan  Frequency: 2x week  Duration in weeks: 4     Upon completion of today's re-evaluation, as evidenced by present objective and subjective measures, Madi sx remain consistent with continued recovery from being s/p R TKA 17  Pt will benefit from 2-4 weeks of continued skilled physical therapy to address current deficits prior to safe D/C to HEP  Subjective Evaluation    History of Present Illness  Date of surgery: 2017  Pain  Current pain ratin  At best pain ratin  At worst pain ratin  Location: R knee       Pt reports she feels she continues to improve with each session  Feels she is about 75% improved overall  HEP continues to go well  Anxious to continue making progress with hopeful D/C to HEP within a few weeks  Objective Pain level ranges 0-4/10  AROM: R knee -2 - 110 degrees (PROM 0 - 115 degrees);  L knee 0-115 degrees  R knee strength 4+/5  Gait: WNL 75% of the time with mild intermittent mild lateral limp with R knee extension lag  Able to walk with 75% improved mechanics for <  1 mile blocks with mild to mod pain and limitation  Able to squat at 75% capacity with mild to mod pain and limitation  Able to complete transfers > 75% of the time with mild to no pain and limitation  Able to ascend/descend a full flight of stairs at > 75% capacity with mild to no pain and limitation    Precautions: 17 date of surgery     Daily Treatment Diary     HEP: Sheet provided and discussed     Manual   2/19/18  2/20/18  2/23/18  2/27/18  2/28/18  3/5/18           ART                       IASTM                       JM                     PROM and LE stretch 15'  15' 15'  15'  15'  x15'           STM                          Exercise Diary       2/23/18  2/27/18  2/28/18  3/5/18           TM                       Bike 10'  10'  10'  10'  10'  10'           NuStep                       Standing 1/2 Roll calf stretch 6/20''  6/20"  6/20" 6/20"  6/20"  6x20''           Ankle Pumps 3/10  3/10  3/10  3/10 3/10  3x10           HR/TR 3/10  3/10  3/10  3/10  3/10  3x10           TB TKE L43/10 L4 3/10  L 4 3/10  3/10 L4  3/10 L4  3x10 Lv4           TB Heel to Toes L4 3/10  l4 3/10  L 4 3/10  3/10 L4  3/10 L4  3x10 Lv4           Forward/backward heel to toe walk 3/10  3/10 3/10  3/10  3/10  3x10           Sit to stand 3/10  3/10  3/10  3/10 3/10  3x10            AE Steamboats      2/10  3/10  3/10  3x10           Bridge with Add squeeze                       HS into QS 3/10  3/10  3/10  3/10  3/10  3x10           Clam shells                       HS curl machine 5pl 3/10  5pl 3/10  5pl 3/10  5pl 3/10  5Pl 3/10  5pl 4x10           Leg Press 3pl 3/10  3pl 3/10  3pl 3/10  hold pain in L knee  hold  hold           Fwd step downs          6"   3/10  3x10 6''           TB Lat Walks 3/10 L4  L4 3/10  L 4 3/10  3/10 L4  3/10 L4  lv4 3x10           Step ups fwd/lat  8" 3/10  8" 3/10 8" 3/10  8" 3/10  8" 3/10  8'' 3x10           Wall Slides 3/10                           Modalities       2/23/18  2/27/18  2/28/18  3/5/18           MHP                       CP  10'  10'  10'  10'  10'  x10'           Stim

## 2018-03-05 NOTE — PROGRESS NOTES
PT Re-Evaluation     Today's date: 2018  Patient name: Christian Parr  : 1950  MRN: [id number]  Referring provider: Adriana Cedillo  Dx:   Encounter Diagnosis   Name Primary?  Presence of right artificial knee joint Yes                  Assessment  Understanding of Dx/Px/POC: fair and good   Prognosis: good    Goals  STGs: To be complete within 2-3 weeks  - Decrease pain to < 2/10 at worst (partially met)  - Increase AROM to 0-120 degrees (partially met)  - Increase strength to > 5/5 (partially met)  - Improve gait pattern to WNL for distances < 1 mile (partially met)    LTGs: To be complete within 3-4 weeks  - Able to walk for > 1 mile without pain or limitation for increased safety and functional capacity with Activities of daily living and work-related duties (partially met)  - Able to squat repetitively without pain or limitation for increased safety and functional capacity with Activities of daily living and work-related duties (partially met)  - Able to repetitively ascend/descend stairs with without pain or limitation for increased safety and functional capacity with Activities of daily living and work-related duties (partially met)   - Able to repetitively complete transfers without pain or limitation for increased safety and functional capacity with Activities of daily living and work-related duties (partially met)    Plan  Frequency: 2x week  Duration in weeks: 4     Upon completion of today's re-evaluation, as evidenced by present objective and subjective measures, [employer] sx remain consistent with continued recovery from being s/p R TKA 17  Pt will benefit from 2-4 weeks of continued skilled physical therapy to address current deficits prior to safe D/C to HEP      Subjective Evaluation    History of Present Illness  Date of surgery: 2017  Pain  Current pain ratin  At best pain ratin  At worst pain ratin  Location: R knee       Pt reports she feels she continues to improve with each session  Feels she is about 75% improved overall  HEP continues to go well  Anxious to continue making progress with hopeful D/C to HEP within a few weeks  Objective Pain level ranges 0-4/10  AROM: R knee -2 - 110 degrees (PROM 0 - 115 degrees);  L knee 0-115 degrees  R knee strength 4+/5  Gait: WNL 75% of the time with mild intermittent mild lateral limp with R knee extension lag  Able to walk with 75% improved mechanics for <  1 mile blocks with mild to mod pain and limitation  Able to squat at 75% capacity with mild to mod pain and limitation  Able to complete transfers > 75% of the time with mild to no pain and limitation  Able to ascend/descend a full flight of stairs at > 75% capacity with mild to no pain and limitation    Precautions: 12/28/17 date of surgery     Daily Treatment Diary     HEP: Sheet provided and discussed     Manual   2/19/18 2/20/18 2/23/18 2/27/18 2/28/18           ART                     IASTM                     JM                     PROM and LE stretch 15'  15' 15'  15'  15'           STM                        Exercise Diary       2/23/18 2/27/18 2/28/18           TM                     Bike 10'  10'  10'  10'  10'           NuStep                     Standing 1/2 Roll calf stretch 6/20''  6/20"  6/20" 6/20"  6/20"           Ankle Pumps 3/10  3/10  3/10  3/10 3/10           HR/TR 3/10  3/10  3/10  3/10  3/10           TB TKE L43/10 L4 3/10  L 4 3/10  3/10 L4  3/10 L4           TB Heel to Toes L4 3/10  l4 3/10  L 4 3/10  3/10 L4  3/10 L4           Forward/backward heel to toe walk 3/10  3/10 3/10  3/10  3/10           Sit to stand 3/10  3/10  3/10  3/10 3/10            AE Steamboats      2/10  3/10  3/10           Bridge with Add squeeze                     HS into QS 3/10  3/10  3/10  3/10  3/10           Clam shells                     HS curl machine 5pl 3/10  5pl 3/10  5pl 3/10  5pl 3/10  5Pl 3/10           Leg Press 3pl 3/10  3pl 3/10  3pl 3/10  hold pain in L knee  hold           Fwd step downs          6"   3/10           TB Lat Walks 3/10 L4  L4 3/10  L 4 3/10  3/10 L4  3/10 L4           Step ups fwd/lat  8" 3/10  8" 3/10 8" 3/10  8" 3/10  8" 3/10           Wall Slides 3/10                         Modalities       2/23/18 2/27/18 2/28/18           MHP                     CP  10'  10'  10'  10'  10'           Stim

## 2018-03-07 ENCOUNTER — APPOINTMENT (OUTPATIENT)
Dept: PHYSICAL THERAPY | Facility: CLINIC | Age: 68
End: 2018-03-07
Payer: COMMERCIAL

## 2018-03-09 ENCOUNTER — OFFICE VISIT (OUTPATIENT)
Dept: PHYSICAL THERAPY | Facility: CLINIC | Age: 68
End: 2018-03-09
Payer: COMMERCIAL

## 2018-03-09 DIAGNOSIS — Z96.651 PRESENCE OF RIGHT ARTIFICIAL KNEE JOINT: Primary | ICD-10-CM

## 2018-03-09 PROCEDURE — 97110 THERAPEUTIC EXERCISES: CPT

## 2018-03-09 PROCEDURE — 97140 MANUAL THERAPY 1/> REGIONS: CPT

## 2018-03-09 NOTE — PROGRESS NOTES
Daily Note     Today's date: 3/9/2018  Patient name: Serafin Lindsay  : 1950  MRN: [id number]  Referring provider: Dominic Sosa  Dx:   Encounter Diagnosis     ICD-10-CM    1  Presence of right artificial knee joint Z96 651                   Subjective: Pt reports she is doing well and feels like she is getting knee straighter  Objective: See treatment diary below      Assessment: Tolerated treatment well  Patient exhibited good technique with therapeutic exercises  Pt able to demonstrate 0 degrees of R knee ext AROM today  Pt cont to progress toward goals with overall improved strength and rom  Plan: Continue per plan of care        Precautions: 17 date of surgery     Daily Treatment Diary     HEP: Sheet provided and discussed     Manual   18             ART                       IASTM                       JM                       PROM and LE stretch 15'  15' 15'  15'  15'             STM                          Exercise Diary       2/23/18  2/27/18  2/28/18  3/9/18           TM                       Bike 10'  10'  10'  10'  10'  10'           NuStep                       Standing / Roll calf stretch ''  "  " "  "  "           Ankle Pumps 3/10  3/10  3/10  3/10 3/10  3/10           HR/TR 3/10  3/10  3/10  3/10  3/10  3/10           TB TKE L43/10 L4 3/10  L 4 3/10  3/10 L4  3/10 L4  3/10 L4           TB Heel to Toes L4 3/10  l4 3/10  L 4 3/10  3/10 L4  3/10 L4  3/10 L4           Forward/backward heel to toe walk 3/10  3/10 3/10  3/10  3/10  3/10           Sit to stand 3/10  3/10  3/10  3/10 3/10  3/10            AE Steamboats      2/10  3/10  3/10  3/10           Bridge with Add squeeze                       HS into QS 3/10  3/10  3/10  3/10  3/10  3/10           Clam shells                       HS curl machine 5pl 3/10  5pl 3/10  5pl 3/10  5pl 3/10  5Pl 3/10  5pl 3/10           Leg Press 3pl 3/10  3pl 3/10  3pl 3/10  hold pain in L knee  hold             Fwd step downs          6"   3/10  6" 3/10           TB Lat Walks 3/10 L4  L4 3/10  L 4 3/10  3/10 L4  3/10 L4  3/10 L4           Step ups fwd/lat  8" 3/10  8" 3/10 8" 3/10  8" 3/10  8" 3/10  8" 3/10           Wall Slides 3/10                           Modalities       2/23/18  2/27/18  2/28/18  3/9/18           MHP                       CP  10'  10'  10'  10'  10'  10'           Stim

## 2018-03-13 ENCOUNTER — APPOINTMENT (OUTPATIENT)
Dept: PHYSICAL THERAPY | Facility: CLINIC | Age: 68
End: 2018-03-13
Payer: COMMERCIAL

## 2018-03-14 ENCOUNTER — OFFICE VISIT (OUTPATIENT)
Dept: PHYSICAL THERAPY | Facility: CLINIC | Age: 68
End: 2018-03-14
Payer: COMMERCIAL

## 2018-03-14 DIAGNOSIS — Z96.651 PRESENCE OF RIGHT ARTIFICIAL KNEE JOINT: Primary | ICD-10-CM

## 2018-03-14 PROCEDURE — 97140 MANUAL THERAPY 1/> REGIONS: CPT

## 2018-03-14 PROCEDURE — 97110 THERAPEUTIC EXERCISES: CPT

## 2018-03-14 NOTE — PROGRESS NOTES
Daily Note     Today's date: 3/14/2018  Patient name: Lucy Montaño  : 1950  MRN: [id number]  Referring provider: Toribio Blake  Dx:   Encounter Diagnosis     ICD-10-CM    1  Presence of right artificial knee joint Z96 651                   Subjective: Pt reports she is doing well and cont to work on HEP  Reports she is hoping to get to 120 degrees of knee flx  Objective: See treatment diary below      Assessment: Tolerated treatment well  Patient exhibited good technique with therapeutic exercises  Pt demonstrates 0 degrees of knee ext arom and 116 degrees of knee flx PROM  Pt with medial knee discomfort with knee flx PROM  Plan: Continue per plan of care       Precautions: 17 date of surgery     Daily Treatment Diary     HEP: Sheet provided and discussed     JM            3/14/18       PROM and LE stretch 15'  15' 15'  15'  15' 15'       STM                      Exercise Diary       2/23/18  2/27/18  2/28/18  3/9/18  3/14/18     TM                   Bike 10'  10'  10'  10'  10'  10'  10'     NuStep                   Standing / Roll calf stretch ''  "  " "  "  "  "     Ankle Pumps 3/10  3/10  3/10  3/10 3/10  3/10  3/10     HR/TR 3/10  3/10  3/10  3/10  3/10  3/10  3/10     TB TKE L43/10 L4 3/10  L 4 3/10  3/10 L4  3/10 L4  3/10 L4  3/10 L5     TB Heel to Toes L4 3/10  l4 3/10  L 4 3/10  3/10 L4  3/10 L4  3/10 L4  3/10 L5     Forward/backward heel to toe walk 3/10  3/10 3/10  3/10  3/10  3/10  3/10     Sit to stand 3/10  3/10  3/10  3/10 3/10  3/10  3/10      AE Steamboats      2/10  3/10  3/10  3/10  3/10     Bridge with Add squeeze                   HS into QS 3/10  3/10  3/10  3/10  3/10  3/10  3/10     Clam shells                   HS curl machine 5pl 3/10  5pl 3/10  5pl 3/10  5pl 3/10  5Pl 3/10  5pl 3/10  6pl 3/10     Leg Press 3pl 3/10  3pl 3/10  3pl 3/10  hold pain in L knee  hold         Fwd step downs          6"   310  6" 3/10  6" 310     TB Lat Walks 3/10 L4  L4 3/10  L 4 3/10  3/10 L4  3/10 L4  3/10 L4  3/10 L4     Step ups fwd/lat  8" 3/10  8" 3/10 8" 3/10  8" 3/10  8" 3/10  8" 3/10  8" 3/10     Wall Slides 3/10                       Modalities       2/23/18  2/27/18  2/28/18  3/9/18  3/14/18         MHP                       CP  10'  10'  10'  10'  10'  10'  10'         Stim

## 2018-03-15 ENCOUNTER — APPOINTMENT (OUTPATIENT)
Dept: PHYSICAL THERAPY | Facility: CLINIC | Age: 68
End: 2018-03-15
Payer: COMMERCIAL

## 2018-03-16 ENCOUNTER — OFFICE VISIT (OUTPATIENT)
Dept: PHYSICAL THERAPY | Facility: CLINIC | Age: 68
End: 2018-03-16
Payer: COMMERCIAL

## 2018-03-16 DIAGNOSIS — Z96.651 PRESENCE OF RIGHT ARTIFICIAL KNEE JOINT: Primary | ICD-10-CM

## 2018-03-16 PROCEDURE — 97530 THERAPEUTIC ACTIVITIES: CPT | Performed by: PHYSICAL THERAPIST

## 2018-03-16 PROCEDURE — 97110 THERAPEUTIC EXERCISES: CPT | Performed by: PHYSICAL THERAPIST

## 2018-03-16 PROCEDURE — 97112 NEUROMUSCULAR REEDUCATION: CPT | Performed by: PHYSICAL THERAPIST

## 2018-03-16 PROCEDURE — 97140 MANUAL THERAPY 1/> REGIONS: CPT | Performed by: PHYSICAL THERAPIST

## 2018-03-16 NOTE — PROGRESS NOTES
Daily Note     Today's date: 3/16/2018  Patient name: Betsey Dominguez  : 1950  MRN: [id number]  Referring provider: Silvestre Carrasco  Dx:   Encounter Diagnosis     ICD-10-CM    1  Presence of right artificial knee joint Z96 651                   Subjective: Pt reports she is doing well and cont to work on HEP  Reports she is hoping to get to 120 degrees of knee flx  Objective: See treatment diary below      Assessment: Tolerated treatment well  Patient exhibited good technique with therapeutic exercises  Pt demonstrates 0 degrees of knee ext arom and 116 degrees of knee flx PROM  Pt with medial knee discomfort with knee flx PROM  Plan: Continue per plan of care       Precautions: 17 date of surgery     Daily Treatment Diary     HEP: Sheet provided and discussed     JM            3/14/18  3/16/18     PROM and LE stretch 15'  15' 15'  15'  15' 15'  x15'     STM                      Exercise Diary       2/23/18  2/27/18  2/28/18  3/9/18  3/14/18  3/16/18   TM                   Bike 10'  10'  10'  10'  10'  10'  10'  10'   NuStep                   Standing 1/2 Roll calf stretch ''  "  " "  "  "  "  6x20''   Ankle Pumps 3/10  3/10  3/10  3/10 3/10  3/10  3/10  3x10   HR/TR 3/10  3/10  3/10  3/10  3/10  3/10  3/10  3x10   TB TKE L43/10 L4 3/10  L 4 3/10  3/10 L4  3/10 L4  3/10 L4  3/10 L5  3x10 L5   TB Heel to Toes L4 3/10  l4 3/10  L 4 3/10  3/10 L4  3/10 L4  3/10 L4  3/10 L5  3x10 L5   Forward/backward heel to toe walk 3/10  3/10 3/10  3/10  3/10  3/10  3/10  3x10   Sit to stand 3/10  3/10  3/10  3/10 3/10  3/10  3/10  3x10    AE Steamboats      2/10  3/10  3/10  3/10  3/10  3x10   Bridge with Add squeeze                   HS into QS 3/10  3/10  3/10  3/10  3/10  3/10  3/10  3x10   Clam shells                   HS curl machine 5pl 3/10  5pl 3/10  5pl 3/10  5pl 3/10  5Pl 3/10  5pl 3/10  6pl 3/10  6pl 3x10   Leg Press 3pl 3/10  3pl 3/10  3pl 3/10  hold pain in L knee  hold         Fwd step downs          6"   3/10  6" 3/10  6" 3/10  3x10 6"   TB Lat Walks 3/10 L4  L4 3/10  L 4 3/10  3/10 L4  3/10 L4  3/10 L4  3/10 L4  3x10 L5   Step ups fwd/lat  8" 3/10  8" 3/10 8" 3/10  8" 3/10  8" 3/10  8" 3/10  8" 3/10  3x10 8"   Wall Slides 3/10                       Modalities       2/23/18  2/27/18  2/28/18  3/9/18  3/14/18  3/16/18       MHP                       CP  10'  10'  10'  10'  10'  10'  10'  10'       Stim

## 2018-03-19 ENCOUNTER — OFFICE VISIT (OUTPATIENT)
Dept: PHYSICAL THERAPY | Facility: CLINIC | Age: 68
End: 2018-03-19
Payer: COMMERCIAL

## 2018-03-19 DIAGNOSIS — Z96.651 PRESENCE OF RIGHT ARTIFICIAL KNEE JOINT: Primary | ICD-10-CM

## 2018-03-19 PROCEDURE — 97110 THERAPEUTIC EXERCISES: CPT

## 2018-03-19 PROCEDURE — 97140 MANUAL THERAPY 1/> REGIONS: CPT

## 2018-03-19 NOTE — PROGRESS NOTES
Daily Note     Today's date: 3/19/2018  Patient name: Dawood Gaxiola  : 1950  MRN: [id number]  Referring provider: Jesica Nova  Dx:   Encounter Diagnosis     ICD-10-CM    1  Presence of right artificial knee joint Z96 651                   Subjective: Pt reports she has had increased stiffness and discomfort along B knees since Friday  Reports icing over the weekend  Objective: See treatment diary below      Assessment: Tolerated treatment well  Patient demonstrated fatigue post treatment  Pt zenia program well with overall c/o B knee discomfort  Pt able to complete full program  Pt with notable warmth along R knee with PROM  Plan: Continue per plan of care     Precautions: 17 date of surgery     Daily Treatment Diary     HEP: Sheet provided and discussed     JM  3/19/18          3/14/18       PROM and LE stretch 15'  15' 15'  15'  15' 15'       STM                      Exercise Diary   3/19/18    2/23/18  2/27/18  2/28/18  3/9/18  3/14/18     TM  6'                 Bike 6'  10'  10'  10'  10'  10'  10'     NuStep                   Standing 1/2 Roll calf stretch ''  "  " "  "  "  "     Ankle Pumps 3/10  3/10  3/10  3/10 3/10  3/10  3/10     HR/TR 3/10  3/10  3/10  3/10  3/10  3/10  3/10     TB TKE L5 3/10 L4 3/10  L 4 3/10  3/10 L4  3/10 L4  3/10 L4  3/10 L5     TB Heel to Toes L5  3/10  l4 3/10  L 4 3/10  3/10 L4  3/10 L4  3/10 L4  3/10 L5     Forward/backward heel to toe walk 3/10  3/10 3/10  3/10  3/10  3/10  3/10     Sit to stand 3/10  3/10  3/10  3/10 3/10  3/10  3/10      AE Steamboats  3/10    2/10  3/10  3/10  3/10  3/10     Bridge with Add squeeze                   HS into QS 3/10  3/10  3/10  3/10  3/10  3/10  3/10     Clam shells                   HS curl machine 6pl 3/10  5pl 3/10  5pl 3/10  5pl 3/10  5Pl 3/10  5pl 3/10  6pl 3/10     Leg Press   3pl 3/10  3pl 3/10  hold pain in L knee  hold         Fwd step downs  6" 3/10        6"   3/10  6" 3/10  6" 3/10     TB Lat Walks 3/10 L5  L4 3/10  L 4 3/10  3/10 L4  3/10 L4  3/10 L4  3/10 L4     Step ups fwd/lat  8" 3/10  8" 3/10 8" 3/10  8" 3/10  8" 3/10  8" 3/10  8" 3/10     Wall Slides 3/10                       Modalities   3/19/18    2/23/18  2/27/18  2/28/18  3/9/18  3/14/18         MHP                       CP  10'  10'  10'  10'  10'  10'  10'         Stim

## 2018-03-20 ENCOUNTER — APPOINTMENT (OUTPATIENT)
Dept: PHYSICAL THERAPY | Facility: CLINIC | Age: 68
End: 2018-03-20
Payer: COMMERCIAL

## 2018-03-22 ENCOUNTER — OFFICE VISIT (OUTPATIENT)
Dept: PHYSICAL THERAPY | Facility: CLINIC | Age: 68
End: 2018-03-22
Payer: COMMERCIAL

## 2018-03-22 DIAGNOSIS — Z96.651 PRESENCE OF RIGHT ARTIFICIAL KNEE JOINT: Primary | ICD-10-CM

## 2018-03-22 PROCEDURE — 97110 THERAPEUTIC EXERCISES: CPT

## 2018-03-22 PROCEDURE — 97140 MANUAL THERAPY 1/> REGIONS: CPT

## 2018-03-22 NOTE — PROGRESS NOTES
Daily Note     Today's date: 3/22/2018  Patient name: Naty Henriquez  : 1950  MRN: [id number]  Referring provider: Honey Boeck  Dx:   Encounter Diagnosis     ICD-10-CM    1  Presence of right artificial knee joint Z96 651                   Subjective: Pt reports less overall pain today  Reports able to shovel and snow blow with min increased pain  Objective: See treatment diary below      Assessment: Tolerated treatment well  Patient exhibited good technique with therapeutic exercises  Pt with improved zenia to exercises today and able to zenia 8" step with fwd step ups  Pt demonstrated 110 degrees of knee flx AROM 115 degrees of AAROM knee flx  Plan: Continue per plan of care         Precautions: 17 date of surgery     Daily Treatment Diary     HEP: Sheet provided and discussed     JM  3/19/18  3/22/18        3/14/18       PROM and LE stretch 15'  15' 15'  15'  15' 15'       STM                      Exercise Diary   3/19/18  3/22/18  2/23/18  2/27/18  2/28/18  3/9/18  3/14/18     TM  6'                 Bike 6'  10'  10'  10'  10'  10'  10'     NuStep                   Standing / Roll calf stretch ''  "  " "  "  "  "     Ankle Pumps 3/10  3/10  3/10  3/10 3/10  3/10  3/10     HR/TR 3/10  3/10  3/10  3/10  3/10  3/10  3/10     TB TKE L5 3/10 L5 3/10  L 4 3/10  3/10 L4  3/10 L4  3/10 L4  3/10 L5     TB Heel to Toes L5  3/10  l5 3/10  L 4 3/10  3/10 L4  3/10 L4  3/10 L4  3/10 L5     Forward/backward heel to toe walk 3/10   3/10  3/10  3/10  3/10  3/10     Sit to stand 3/10  3/10  3/10  3/10 3/10  3/10  3/10      AE Steamboats  3/10 3/10  2/10  3/10  3/10  3/10  3/10     Bridge with Add squeeze    3/10               HS into QS 3/10  3/10  3/10  3/10  3/10  3/10  3/10     Clam shells                   HS curl machine 6pl 3/10  6pl 3/10  5pl 3/10  5pl 3/10  5Pl 3/10  5pl 3/10  6pl 3/10     Leg Press      3pl 3/10  hold pain in L knee  hold         Fwd step downs  6" 3/10  6" 3/10      6"   3/10  6" 3/10  6" 3/10     TB Lat Walks 3/10 L5  L4 3/10  L 4 3/10  3/10 L4  3/10 L4  3/10 L4  3/10 L4     Step ups fwd/lat  8" 3/10  8" 3/10 8" 3/10  8" 3/10  8" 3/10  8" 3/10  8" 3/10     Wall Slides 3/10                       Modalities   3/19/18  3/22/18  2/23/18  2/27/18  2/28/18  3/9/18  3/14/18         MHP                       CP  10'  10'  10'  10'  10'  10'  10'         Stim

## 2018-03-27 ENCOUNTER — OFFICE VISIT (OUTPATIENT)
Dept: PHYSICAL THERAPY | Facility: CLINIC | Age: 68
End: 2018-03-27
Payer: COMMERCIAL

## 2018-03-27 DIAGNOSIS — Z96.651 PRESENCE OF RIGHT ARTIFICIAL KNEE JOINT: Primary | ICD-10-CM

## 2018-03-27 PROCEDURE — 97110 THERAPEUTIC EXERCISES: CPT

## 2018-03-27 PROCEDURE — 97140 MANUAL THERAPY 1/> REGIONS: CPT

## 2018-03-27 NOTE — PROGRESS NOTES
Daily Note     Today's date: 3/27/2018  Patient name: Lisa Latif  : 1950  MRN: [id number]  Referring provider: Haley Allen  Dx:   Encounter Diagnosis     ICD-10-CM    1  Presence of right artificial knee joint Z96 651                   Subjective: Pt reports she has stiffness  Reports having improvement with stairs  Objective: See treatment diary below      Assessment: Tolerated treatment well  Patient demonstrated fatigue post treatment  Pt able to complete program with overall improved zenia as she was able to descend 8" step with improved zenia  Plan: Continue per plan of care       Precautions: 17 date of surgery     Daily Treatment Diary     HEP: Sheet provided and discussed     JM  3/19/18  3/22/18  3/27/18      3/14/18       PROM and LE stretch 15'  15' 15'  15'  15' 15'       STM                      Exercise Diary   3/19/18  3/22/18  3/27/18  2/27/18  2/28/18  3/9/18  3/14/18     TM  6'                 Bike 6'  10'  10'  10'  10'  10'  10'     NuStep                   Standing 1/ Roll calf stretch ''  "  " "  "  "  "     Ankle Pumps 3/10  3/10  3/10  3/10 3/10  3/10  3/10     HR/TR 3/10  3/10  3/10  3/10  3/10  3/10  3/10     TB TKE L5 3/10 L5 3/10  L 5 3/10  3/10 L4  3/10 L4  3/10 L4  3/10 L5     TB Heel to Toes L5  3/10  l5 3/10  L 5 3/10  3/10 L4  3/10 L4  3/10 L4  3/10 L5     Forward/backward heel to toe walk 3/10   3/10  3/10  3/10  3/10  3/10     Sit to stand 3/10  3/10  3/10  3/10 3/10  3/10  3/10      AE Steamboats  3/10 3/10  3/10  3/10  3/10  3/10  3/10     Bridge with Add squeeze    3/10  3/10             HS into QS 3/10  3/10  3/10  3/10  3/10  3/10  3/10     Clam shells                   HS curl machine 6pl 3/10  6pl 3/10  6pl 3/10  5pl 3/10  5Pl 3/10  5pl 3/10  6pl 3/10     Leg Press        hold pain in L knee  hold         Fwd step downs  6" 3/10  6" 3/10 8" 3/10    6"   3/10  6" 3/10  6" 3/10     TB Lat Walks 3/10 L5  L4 3/10  L 4 3/10  3/10 L4  3/10 L4  3/10 L4  3/10 L4     Step ups fwd/lat  8" 3/10  8" 3/10 8" 3/10  8" 3/10  8" 3/10  8" 3/10  8" 3/10     Wall Slides 3/10                       Modalities   3/19/18  3/22/18  3/27/18  2/27/18  2/28/18  3/9/18  3/14/18         MHP                       CP  10'  10'  10'  10'  10'  10'  10'         Stim

## 2018-03-29 ENCOUNTER — OFFICE VISIT (OUTPATIENT)
Dept: PHYSICAL THERAPY | Facility: CLINIC | Age: 68
End: 2018-03-29
Payer: COMMERCIAL

## 2018-03-29 DIAGNOSIS — Z96.651 PRESENCE OF RIGHT ARTIFICIAL KNEE JOINT: Primary | ICD-10-CM

## 2018-03-29 PROCEDURE — 97140 MANUAL THERAPY 1/> REGIONS: CPT

## 2018-03-29 PROCEDURE — 97110 THERAPEUTIC EXERCISES: CPT

## 2018-03-29 NOTE — PROGRESS NOTES
Daily Note     Today's date: 3/29/2018  Patient name: Lashawn Jolly  : 1950  MRN: [id number]  Referring provider: Oseas Molina  Dx:   Encounter Diagnosis     ICD-10-CM    1  Presence of right artificial knee joint [id number]                   Subjective: Reports she cont to walk and work on steps at home  Reports overall improvement in walking and ambulating stairs  Will see MD next Wednesday  Objective: See treatment diary below      Assessment: Tolerated treatment well  Patient demonstrated fatigue post treatment and exhibited good technique with therapeutic exercises  Pt able to demonstrate 115 degrees of knee flx AROM and 120 degrees of knee flx PROM today  Pt cont to demo improved overall rom  Plan: Continue per plan of care       Precautions: 17 date of surgery     Daily Treatment Diary     HEP: Sheet provided and discussed     JM  3/19/18  3/22/18  3/27/18  3/29/18    3/14/18       PROM and LE stretch 15'  15' 15'  15'  15' 15'       STM                      Exercise Diary   3/19/18  3/22/18  3/27/18  3/29/18  2/28/18  3/9/18  3/14/18     TM  6'  6'  6'  6'           Bike 6' 6'  6'  6'  10'  10'  10'     NuStep                   Standing /2 Roll calf stretch ''  "  " "  "  "  "     Ankle Pumps 3/10  3/10  3/10  3/10 3/10  3/10  3/10     HR/TR 3/10  3/10  3/10  3/10  3/10  3/10  3/10     TB TKE L5 3/10 L5 3/10  L 5 3/10  3/10 L5  3/10 L4  3/10 L4  3/10 L5     TB Heel to Toes L5  3/10  l5 3/10  L 5 3/10  3/10 L5  3/10 L4  3/10 L4  3/10 L5     Forward/backward heel to toe walk 3/10   3/10  3/10  3/10  3/10  3/10     Sit to stand 3/10  3/10  3/10  3/10 3/10  3/10  3/10      AE Steamboats  3/10 3/10  3/10  3/10  3/10  3/10  3/10     Bridge with Add squeeze    3/10  3/10             HS into QS 3/10  3/10  3/10  3/10  3/10  3/10  3/10     Clam shells                   HS curl machine 6pl 3/10  6pl 3/10  6pl 3/10  7pl 3/10  5Pl 3/10  5pl 3/10  6pl 3/10     Leg Press          hold         Fwd step downs  6" 3/10  6" 3/10 8" 3/10  8" 3/10  6"   3/10  6" 3/10  6" 3/10     TB Lat Walks 3/10 L5  L4 3/10  L 4 3/10  3/10 L5  3/10 L4  3/10 L4  3/10 L4     Step ups fwd/lat  8" 3/10  8" 3/10 8" 3/10  8" 3/10  8" 3/10  8" 3/10  8" 3/10     Wall Slides 3/10                       Modalities   3/19/18  3/22/18  3/27/18  3/29/18    3/9/18  3/14/18         MHP                       CP  10'  10'  10'  10'  10'  10'  10'         Stim

## 2018-04-03 ENCOUNTER — OFFICE VISIT (OUTPATIENT)
Dept: PHYSICAL THERAPY | Facility: CLINIC | Age: 68
End: 2018-04-03
Payer: COMMERCIAL

## 2018-04-03 DIAGNOSIS — Z96.651 PRESENCE OF RIGHT ARTIFICIAL KNEE JOINT: Primary | ICD-10-CM

## 2018-04-03 PROCEDURE — 97110 THERAPEUTIC EXERCISES: CPT | Performed by: PHYSICAL THERAPIST

## 2018-04-03 PROCEDURE — G8979 MOBILITY GOAL STATUS: HCPCS | Performed by: PHYSICAL THERAPIST

## 2018-04-03 PROCEDURE — 97530 THERAPEUTIC ACTIVITIES: CPT | Performed by: PHYSICAL THERAPIST

## 2018-04-03 PROCEDURE — 97140 MANUAL THERAPY 1/> REGIONS: CPT | Performed by: PHYSICAL THERAPIST

## 2018-04-03 PROCEDURE — 97112 NEUROMUSCULAR REEDUCATION: CPT | Performed by: PHYSICAL THERAPIST

## 2018-04-03 PROCEDURE — G8980 MOBILITY D/C STATUS: HCPCS | Performed by: PHYSICAL THERAPIST

## 2018-04-03 NOTE — PROGRESS NOTES
PT Discharge    Today's date: 2018  Patient name: Feliberto Mujica  : 1950  MRN: [id number]  Referring provider: Anson Garcia  Dx:   Encounter Diagnosis   Name Primary?  Presence of right artificial knee joint Yes                  Assessment  Understanding of Dx/Px/POC: good   Prognosis: good    Goals  STGs: To be complete within 2-3 weeks  - Decrease pain to < 2/10 at worst (met)  - Increase AROM to 0-120 degrees (met)  - Increase strength to > 5/5 (partially met)  - Improve gait pattern to WNL for distances < 1 mile (met)    LTGs: To be complete within 3-4 weeks  - Able to walk for > 1 mile without pain or limitation for increased safety and functional capacity with Activities of daily living and work-related duties (met)  - Able to squat repetitively without pain or limitation for increased safety and functional capacity with Activities of daily living and work-related duties (met)  - Able to repetitively ascend/descend stairs with without pain or limitation for increased safety and functional capacity with Activities of daily living and work-related duties (met)   - Able to repetitively complete transfers without pain or limitation for increased safety and functional capacity with Activities of daily living and work-related duties (met)     Pt will be D/C from skilled physical therapy after today's session as she has achieved all personal and functional goals  Pt has a good understanding of HEP and has been instructed to reach out with any questions/concerns/setbacks  Subjective Evaluation    History of Present Illness  Date of surgery: 2017  Pain  Current pain ratin  At best pain ratin  At worst pain ratin  Location: R knee       Pt reports she feels ready to safely D/C to HEP after today's session as she has achieved all personal and functional goals   Pt reports she has a good understanding of HEP and will reach out with any questions/concerns/setbacks  Objective Pain level ranges 0/10  AROM: R knee 0 - 115 degrees (PROM 0 - 120 degrees);  L knee 0-115 degrees  R knee strength 5/5  Gait: WNL  Able to walk >  1 mile without pain and limitation  Able to squat without pain and limitation  Able to complete transfers without pain and limitation  Able to ascend/descend a full flight of stairs without pain and limitation    Precautions: 12/28/17 date of surgery     Daily Treatment Diary     HEP: Sheet provided and discussed     Manual   4/3/18           ART             IASTM             JM             PROM and LE stretch  15'           STM                Exercise Diary   4/3/18           TM             Bike  10'           NuStep             Standing 1/2 Roll calf stretch  6/20"           Ankle Pumps 3/10           HR/TR  3/10           TB TKE  3/10 L4           TB Heel to Toes  3/10 L4           Forward/backward heel to toe walk  3/10           Sit to stand 3/10            AE Steamboats  3/10           Bridge with Add squeeze             HS into QS  3/10           Clam shells             HS curl machine  5Pl 3/10           Leg Press  hold           Fwd step downs  6"   3/10           TB Lat Walks  3/10 L4           Step ups fwd/lat  8" 3/10           Wall Slides                   Modalities   4/3/18           MHP             CP  10'           Stim                               Flowsheet Rows    Flowsheet Row Most Recent Value   PT/OT G-Codes   [facility] information [facility]  -- [facility]   Assessment Type  Discharge   G code set  Mobility: Walking & Moving Around   Mobility: Walking and Moving Around Goal Status ()  CH   Mobility: Walking and Moving Around Discharge Status ()  [facility]

## 2018-04-03 NOTE — LETTER
April 3, 2018    Jaciel Pearson, [address] [facility] [address]    Patient: Tricia Abernathy   YOB: 1950   Date of Visit: 4/3/2018     Encounter Diagnosis     ICD-10-CM    1  Presence of right artificial knee joint Z96 651        Dear Dr Don Burnett:    Please review the attached Plan of Care from Eunice Anaya's recent visit  Please verify that you agree therapy should continue by signing the attached document and sending it back to our office  If you have any questions or concerns, please don't hesitate to call  Sincerely,    Ashlyn Rivas, Pt, DPT, ATC, ART      Referring Provider:      I certify that I have read the below Plan of Care and certify the need for these services furnished under this plan of treatment while under my care  Jaciel Pearosn PA-C  [address]: [phone]          PT Discharge    Today's date: 2018  Patient name: Tricia Abernathy  : 1950  MRN: [id number]  Referring provider: Mayte Cesar  Dx:   Encounter Diagnosis   Name Primary?     Presence of right artificial knee joint Yes                  Assessment  Understanding of Dx/Px/POC: good   Prognosis: good    Goals  STGs: To be complete within 2-3 weeks  - Decrease pain to < 2/10 at worst (met)  - Increase AROM to 0-120 degrees (met)  - Increase strength to > 5/5 (partially met)  - Improve gait pattern to WNL for distances < 1 mile (met)    LTGs: To be complete within 3-4 weeks  - Able to walk for > 1 mile without pain or limitation for increased safety and functional capacity with Activities of daily living and work-related duties (met)  - Able to squat repetitively without pain or limitation for increased safety and functional capacity with Activities of daily living and work-related duties (met)  - Able to repetitively ascend/descend stairs with without pain or limitation for increased safety and functional capacity with Activities of daily living and work-related duties (met)   - Able to repetitively complete transfers without pain or limitation for increased safety and functional capacity with Activities of daily living and work-related duties (met)     Pt will be D/C from skilled physical therapy after today's session as she has achieved all personal and functional goals  Pt has a good understanding of HEP and has been instructed to reach out with any questions/concerns/setbacks  Subjective Evaluation    History of Present Illness  Date of surgery: 2017  Pain  Current pain ratin  At best pain ratin  At worst pain ratin  Location: R knee       Pt reports she feels ready to safely D/C to HEP after today's session as she has achieved all personal and functional goals  Pt reports she has a good understanding of HEP and will reach out with any questions/concerns/setbacks  Objective Pain level ranges 0/10  AROM: R knee 0 - 115 degrees (PROM 0 - 120 degrees);  L knee 0-115 degrees  R knee strength 5/5  Gait: WNL  Able to walk >  1 mile without pain and limitation  Able to squat without pain and limitation  Able to complete transfers without pain and limitation  Able to ascend/descend a full flight of stairs without pain and limitation    Precautions: 17 date of surgery     Daily Treatment Diary     HEP: Sheet provided and discussed     Manual   4/3/18           ART             IASTM             JM             PROM and LE stretch  15'           STM                Exercise Diary   4/3/18           TM             Bike  10'           NuStep             Standing 1/2 Roll calf stretch  "           Ankle Pumps 3/10           HR/TR  3/10           TB TKE  3/10 L4           TB Heel to Toes  3/10 L4           Forward/backward heel to toe walk  3/10           Sit to stand 3/10            AE Steamboats  3/10           Bridge with Add squeeze             HS into QS  3/10           Clam shells           HS curl machine  5Pl 3/10           Leg Press  hold           Fwd step downs  6"   3/10           TB Lat Walks  3/10 L4           Step ups fwd/lat  8" 3/10           Wall Slides                   Modalities   4/3/18           MHP             CP  10'           Stim                               Flowsheet Rows    Flowsheet Row Most Recent Value   PT/OT G-Codes   FOTO information reviewed  -- [[facility] 10/10]   Assessment Type  Discharge   G code set  Mobility: Walking & Moving Around   Mobility: Walking and Moving Around Goal Status ()  CH   Mobility: Walking and Moving Around Discharge Status ()  [facility] AND [facility]

## 2020-06-04 ENCOUNTER — TRANSCRIBE ORDERS (OUTPATIENT)
Dept: PHYSICAL THERAPY | Facility: CLINIC | Age: 70
End: 2020-06-04

## 2020-06-09 ENCOUNTER — EVALUATION (OUTPATIENT)
Dept: PHYSICAL THERAPY | Facility: CLINIC | Age: 70
End: 2020-06-09
Payer: COMMERCIAL

## 2020-06-09 DIAGNOSIS — M25.532 LEFT WRIST PAIN: Primary | ICD-10-CM

## 2020-06-09 PROCEDURE — 97535 SELF CARE MNGMENT TRAINING: CPT | Performed by: PHYSICAL THERAPIST

## 2020-06-09 PROCEDURE — 97035 APP MDLTY 1+ULTRASOUND EA 15: CPT | Performed by: PHYSICAL THERAPIST

## 2020-06-09 PROCEDURE — 97112 NEUROMUSCULAR REEDUCATION: CPT | Performed by: PHYSICAL THERAPIST

## 2020-06-09 PROCEDURE — 97140 MANUAL THERAPY 1/> REGIONS: CPT | Performed by: PHYSICAL THERAPIST

## 2020-06-09 PROCEDURE — 97161 PT EVAL LOW COMPLEX 20 MIN: CPT | Performed by: PHYSICAL THERAPIST

## 2020-06-10 ENCOUNTER — OFFICE VISIT (OUTPATIENT)
Dept: PHYSICAL THERAPY | Facility: CLINIC | Age: 70
End: 2020-06-10
Payer: COMMERCIAL

## 2020-06-10 DIAGNOSIS — M25.532 LEFT WRIST PAIN: Primary | ICD-10-CM

## 2020-06-10 PROCEDURE — 97035 APP MDLTY 1+ULTRASOUND EA 15: CPT

## 2020-06-10 PROCEDURE — 97112 NEUROMUSCULAR REEDUCATION: CPT

## 2020-06-10 PROCEDURE — 97140 MANUAL THERAPY 1/> REGIONS: CPT

## 2020-06-10 PROCEDURE — 97110 THERAPEUTIC EXERCISES: CPT

## 2020-06-11 ENCOUNTER — OFFICE VISIT (OUTPATIENT)
Dept: PHYSICAL THERAPY | Facility: CLINIC | Age: 70
End: 2020-06-11
Payer: COMMERCIAL

## 2020-06-11 DIAGNOSIS — M25.532 LEFT WRIST PAIN: Primary | ICD-10-CM

## 2020-06-11 PROCEDURE — 97035 APP MDLTY 1+ULTRASOUND EA 15: CPT

## 2020-06-11 PROCEDURE — 97112 NEUROMUSCULAR REEDUCATION: CPT

## 2020-06-11 PROCEDURE — 97140 MANUAL THERAPY 1/> REGIONS: CPT

## 2020-06-15 ENCOUNTER — OFFICE VISIT (OUTPATIENT)
Dept: PHYSICAL THERAPY | Facility: CLINIC | Age: 70
End: 2020-06-15
Payer: COMMERCIAL

## 2020-06-15 DIAGNOSIS — M25.532 LEFT WRIST PAIN: Primary | ICD-10-CM

## 2020-06-15 PROCEDURE — 97110 THERAPEUTIC EXERCISES: CPT

## 2020-06-15 PROCEDURE — 97140 MANUAL THERAPY 1/> REGIONS: CPT

## 2020-06-15 PROCEDURE — 97112 NEUROMUSCULAR REEDUCATION: CPT

## 2020-06-15 PROCEDURE — 97035 APP MDLTY 1+ULTRASOUND EA 15: CPT

## 2020-06-17 ENCOUNTER — OFFICE VISIT (OUTPATIENT)
Dept: PHYSICAL THERAPY | Facility: CLINIC | Age: 70
End: 2020-06-17
Payer: COMMERCIAL

## 2020-06-17 DIAGNOSIS — M25.532 LEFT WRIST PAIN: Primary | ICD-10-CM

## 2020-06-17 PROCEDURE — 97140 MANUAL THERAPY 1/> REGIONS: CPT

## 2020-06-17 PROCEDURE — 97035 APP MDLTY 1+ULTRASOUND EA 15: CPT

## 2020-06-17 PROCEDURE — 97112 NEUROMUSCULAR REEDUCATION: CPT

## 2020-06-22 ENCOUNTER — OFFICE VISIT (OUTPATIENT)
Dept: PHYSICAL THERAPY | Facility: CLINIC | Age: 70
End: 2020-06-22
Payer: COMMERCIAL

## 2020-06-22 DIAGNOSIS — M25.532 LEFT WRIST PAIN: Primary | ICD-10-CM

## 2020-06-22 PROCEDURE — 97140 MANUAL THERAPY 1/> REGIONS: CPT

## 2020-06-22 PROCEDURE — 97110 THERAPEUTIC EXERCISES: CPT

## 2020-06-22 PROCEDURE — 97535 SELF CARE MNGMENT TRAINING: CPT

## 2020-06-22 PROCEDURE — 97035 APP MDLTY 1+ULTRASOUND EA 15: CPT

## 2020-06-22 PROCEDURE — 97112 NEUROMUSCULAR REEDUCATION: CPT

## 2020-06-24 ENCOUNTER — OFFICE VISIT (OUTPATIENT)
Dept: PHYSICAL THERAPY | Facility: CLINIC | Age: 70
End: 2020-06-24
Payer: COMMERCIAL

## 2020-06-24 DIAGNOSIS — M25.532 LEFT WRIST PAIN: Primary | ICD-10-CM

## 2020-06-24 PROCEDURE — 97112 NEUROMUSCULAR REEDUCATION: CPT

## 2020-06-24 PROCEDURE — 97110 THERAPEUTIC EXERCISES: CPT

## 2020-06-24 PROCEDURE — 97035 APP MDLTY 1+ULTRASOUND EA 15: CPT

## 2020-06-24 PROCEDURE — 97140 MANUAL THERAPY 1/> REGIONS: CPT

## 2020-06-24 PROCEDURE — 97535 SELF CARE MNGMENT TRAINING: CPT

## 2020-06-29 ENCOUNTER — OFFICE VISIT (OUTPATIENT)
Dept: PHYSICAL THERAPY | Facility: CLINIC | Age: 70
End: 2020-06-29
Payer: COMMERCIAL

## 2020-06-29 DIAGNOSIS — M25.532 LEFT WRIST PAIN: Primary | ICD-10-CM

## 2020-06-29 PROCEDURE — 97140 MANUAL THERAPY 1/> REGIONS: CPT

## 2020-06-29 PROCEDURE — 97035 APP MDLTY 1+ULTRASOUND EA 15: CPT

## 2020-06-29 PROCEDURE — 97535 SELF CARE MNGMENT TRAINING: CPT

## 2020-06-29 PROCEDURE — 97110 THERAPEUTIC EXERCISES: CPT

## 2020-06-29 PROCEDURE — 97112 NEUROMUSCULAR REEDUCATION: CPT

## 2020-07-01 ENCOUNTER — OFFICE VISIT (OUTPATIENT)
Dept: PHYSICAL THERAPY | Facility: CLINIC | Age: 70
End: 2020-07-01
Payer: COMMERCIAL

## 2020-07-01 DIAGNOSIS — M25.532 LEFT WRIST PAIN: Primary | ICD-10-CM

## 2020-07-01 PROCEDURE — 97035 APP MDLTY 1+ULTRASOUND EA 15: CPT

## 2020-07-01 PROCEDURE — 97140 MANUAL THERAPY 1/> REGIONS: CPT

## 2020-07-01 PROCEDURE — 97110 THERAPEUTIC EXERCISES: CPT

## 2020-07-01 PROCEDURE — 97112 NEUROMUSCULAR REEDUCATION: CPT

## 2020-07-01 PROCEDURE — 97535 SELF CARE MNGMENT TRAINING: CPT

## 2020-07-01 NOTE — PROGRESS NOTES
Daily Note     Today's date: 2020  Patient name: Adamaris Elkins  : 1950  MRN: [id number]  Referring provider: Lata Mckeon DO  Dx: No diagnosis found  Subjective: Pt reports the hand/fingers are improving but the thumb is still painful  Objective: See treatment diary below    Wrist/Hand Comments  AROM left wrist E/F- 60/55; Thumb MP- 0/60; IP- 0/75;opposition--2 0 cm  Strength-  II- 50/60; 3 ALISSON- ; Key-   Circumference at wrist- 15 5 cm; MPs- 19 0 cm  Special tests- (+) FCR, (-) Tinel at wrist    Assessment: Tolerated treatment well today  Pt progressed today with minimal complaints     Plan: Progress treatment as tolerated  Precautions: avoid stressful or repetitive actiivity      Manuals 6/9 6/10 6/11 6/15 6/17 6/22 6/24 6/29 7/1    STM 15 15 15 15 15 15 15 15 15                 HEP   CC  isotoner 15 15                        Neuro Re-Ed             HP/pulsed biph 15 15 13 17 15 15 15 15 15                                                                                  Ther Ex             Theraputty       T 2' T 2' T 2' T/Y 2'                  5 finger        T 2/10 T 2/10 T 2/10 T/Y 2'    Farooq        20# 2/10 20 2/10    Blue        III 2/10 III 2/10    Flexbar        Y 2/10 Y 2/10    DB E/F        3# 2/10 3# 2/10    S/P         0# 2/10                 Ther Activity                                       Gait Training                                       Modalities               12 12 12 12 12 12 12 12 12    CP 15 15 15 15 15 15 15 15 15

## 2020-07-06 ENCOUNTER — OFFICE VISIT (OUTPATIENT)
Dept: PHYSICAL THERAPY | Facility: CLINIC | Age: 70
End: 2020-07-06
Payer: COMMERCIAL

## 2020-07-06 ENCOUNTER — TRANSCRIBE ORDERS (OUTPATIENT)
Dept: PHYSICAL THERAPY | Facility: CLINIC | Age: 70
End: 2020-07-06

## 2020-07-06 DIAGNOSIS — M25.532 LEFT WRIST PAIN: Primary | ICD-10-CM

## 2020-07-06 PROCEDURE — 97140 MANUAL THERAPY 1/> REGIONS: CPT | Performed by: PHYSICAL THERAPIST

## 2020-07-06 PROCEDURE — 97112 NEUROMUSCULAR REEDUCATION: CPT | Performed by: PHYSICAL THERAPIST

## 2020-07-06 PROCEDURE — 97035 APP MDLTY 1+ULTRASOUND EA 15: CPT | Performed by: PHYSICAL THERAPIST

## 2020-07-06 PROCEDURE — 97535 SELF CARE MNGMENT TRAINING: CPT | Performed by: PHYSICAL THERAPIST

## 2020-07-06 NOTE — LETTER
2020    [address], [place] [address] [id number]    Patient: Jose Raul Sanchez   YOB: 1950   Date of Visit: 2020     Encounter Diagnosis     ICD-10-CM    1  Left wrist pain M25 532        Dear Dr Timothy Sandhu: Thank you for your recent referral of Jose Raul Sanchez  Please review the attached evaluation summary from Serenity's recent visit  Please verify that you agree with the plan of care by signing the attached order  If you have any questions or concerns, please do not hesitate to call  I sincerely appreciate the opportunity to share in the care of one of your patients and hope to have another opportunity to work with you in the near future  Sincerely,    Yi Segovia, DPT, CHT    Referring Provider:      I certify that I have read the below Plan of Care and certify the need for these services furnished under this plan of treatment while under my care  [address],   [place] [id number]  VIA Facsimile: [phone]          PT Re-Evaluation     Today's date: 2020  Patient name: Jose Raul Sanchez  : 1950  MRN: [id number]  Referring provider: Khurram Molina DO  Dx:   Encounter Diagnosis     ICD-10-CM    1  Left wrist pain M25 532                   Assessment  Assessment details: Pt is a 72 YO female presenting to PT with pain, decreased AROM, strength and tolerance to activity  Pt would benefit from skilled intervention to address these issues and maximize overall function  Occupation- retired  Dominant- left; Involved- left  Pt notes less pain with improved motion  She has been caution to avoid repetitive or strong grasp with activity  Goals  ST  Decrease pain to 2-3/10 in 4 weeks            2  Decrease swelling in fingers and wrist            3  Increase AROM to composite fist and extension            4   Provide compression for support  LT    Increase functional motion and strength for independence with ADL and self care by DC            2  Ability to RT recreational activity by DC    Plan  Patient would benefit from: skilled physical therapy  Planned modality interventions: thermotherapy: hydrocollator packs, cryotherapy and ultrasound  Planned therapy interventions: manual therapy, neuromuscular re-education, orthotic fitting/training, strengthening, stretching, therapeutic activities, therapeutic exercise and home exercise program  Frequency: 3x week  Duration in weeks: 4  Treatment plan discussed with: patient        Subjective Evaluation    History of Present Illness  Mechanism of injury: Progressive worsening of symptoms from repetitive use while making COVID masks  Pain  Current pain rating: 3  At best pain ratin  At worst pain ratin  Location: left thumb IF, MF and volar wrist    Hand dominance: left    Treatments  Current treatment: physical therapy  Patient Goals  Patient goals for therapy: decreased edema, decreased pain, increased strength, independence with ADLs/IADLs, return to sport/leisure activities and increased motion          Objective     General Comments:      Wrist/Hand Comments  Pt noting tingling in her thumb and IF; PT issued a SA wrist support for nighttime   and when active during the day  AROM left wrist E/F- 60/55; Thumb MP- 0/60; IP- 0/75;opposition--2 0 cm  Strength-  II- 50/60; 3 ALISSON- ; Key-   Circumference at wrist- 15 5 cm; MPs- 19 0 cm;  Thumb P1-5 6 cm; P1- 5 6 cm  Special tests- (+) FCR, (-) Tinel at wrist  Pt cont to note occasional tingling in her thumb and wrist area             Precautions: avoid stressful or repetitive actiivity  Manuals 6/9 6/10 6/11 6/15 6/17 6/22 6/24 6/29 7/1  7/3   STM 15 15 15 15 15 15 15 15 15  15                           HEP  CC  isotoner  SA wrist support 15 15                      15                           Neuro Re-Ed                      HP/pulsed biph 15 15 15 15 15 15 15 15 15  15                                                                                                                                                   Ther Ex                       Theraputty            T 2' T 2' T 2' T/Y 2'  T/Y 2'                 5 finger            T 2/10 T 2/10 T 2/10 T/Y 2'  T/Y 2'   Farooq               20# 2/10 20 2/10  20 2/10   Blue               III 2/10 III 2/10  III 2/10   Flexbar               Y 2/10 Y 2/10  Y 2/10   DB E/F               3# 2/10 3# 2/10  3 2/10   S/P                 0# 2/10  --->                                                                                                                                                                         Modalities                       US  12 12 12 12 12 12 12 12 12  12   CP 15 15 15 15 15 15 15 15 15  15

## 2020-07-06 NOTE — PROGRESS NOTES
PT Re-Evaluation     Today's date: 2020  Patient name: Christian Parr  : 1950  MRN: [id number]  Referring provider: Monika Peace DO  Dx:   Encounter Diagnosis     ICD-10-CM    1  Left wrist pain M25 532                   Assessment  Assessment details: Pt is a 70 YO female presenting to PT with pain, decreased AROM, strength and tolerance to activity  Pt would benefit from skilled intervention to address these issues and maximize overall function  Occupation- retired  Dominant- left; Involved- left  Pt notes less pain with improved motion  She has been caution to avoid repetitive or strong grasp with activity  Goals  ST  Decrease pain to 2-3/10 in 4 weeks            2  Decrease swelling in fingers and wrist            3  Increase AROM to composite fist and extension            4   Provide compression for support  LT  Increase functional motion and strength for independence with ADL and self care by DC            2   Ability to RT recreational activity by DC    Plan  Patient would benefit from: skilled physical therapy  Planned modality interventions: thermotherapy: hydrocollator packs, cryotherapy and ultrasound  Planned therapy interventions: manual therapy, neuromuscular re-education, orthotic fitting/training, strengthening, stretching, therapeutic activities, therapeutic exercise and home exercise program  Frequency: 3x week  Duration in weeks: 4  Treatment plan discussed with: patient        Subjective Evaluation    History of Present Illness  Mechanism of injury: Progressive worsening of symptoms from repetitive use while making COVID masks  Pain  Current pain rating: 3  At best pain ratin  At worst pain ratin  Location: left thumb IF, MF and volar wrist    Hand dominance: left    Treatments  Current treatment: physical therapy  Patient Goals  Patient goals for therapy: decreased edema, decreased pain, increased strength, independence with ADLs/IADLs, return to sport/leisure activities and increased motion          Objective     General Comments:      Wrist/Hand Comments  Pt noting tingling in her thumb and IF; PT issued a SA wrist support for nighttime   and when active during the day  AROM left wrist E/F- 60/55; Thumb MP- 0/60; IP- 0/75;opposition--2 0 cm  Strength-  II- 50/60; 3 ALISSON- 8/14; Key- 12/14  Circumference at wrist- 15 5 cm; MPs- 19 0 cm;  Thumb P1-5 6 cm; P1- 5 6 cm  Special tests- (+) FCR, (-) Tinel at wrist  Pt cont to note occasional tingling in her thumb and wrist area             Precautions: avoid stressful or repetitive actiivity  Manuals 6/9 6/10 6/11 6/15 6/17 6/22 6/24 6/29 7/1  7/3   STM 15 15 15 15 15 15 15 15 15  15                           HEP  CC  isotoner  SA wrist support 15 15                      15                           Neuro Re-Ed 6/9                     HP/pulsed biph 15 15 15 15 15 15 15 15 15  15                                                                                                                                                   Ther Ex                       Theraputty            T 2' T 2' T 2' T/Y 2'  T/Y 2'                 5 finger            T 2/10 T 2/10 T 2/10 T/Y 2'  T/Y 2'   Farooq               20# 2/10 20 2/10  20 2/10   Blue               III 2/10 III 2/10  III 2/10   Flexbar               Y 2/10 Y 2/10  Y 2/10   DB E/F               3# 2/10 3# 2/10  3 2/10   S/P                 0# 2/10  --->                                                                                                                                                                         Modalities                       US  12 12 12 12 12 12 12 12 12  12   CP 15 15 15 15 15 15 15 15 15  15

## 2020-07-08 ENCOUNTER — OFFICE VISIT (OUTPATIENT)
Dept: PHYSICAL THERAPY | Facility: CLINIC | Age: 70
End: 2020-07-08
Payer: COMMERCIAL

## 2020-07-08 DIAGNOSIS — M25.532 LEFT WRIST PAIN: Primary | ICD-10-CM

## 2020-07-08 PROCEDURE — 97035 APP MDLTY 1+ULTRASOUND EA 15: CPT

## 2020-07-08 PROCEDURE — 97535 SELF CARE MNGMENT TRAINING: CPT

## 2020-07-08 PROCEDURE — 97110 THERAPEUTIC EXERCISES: CPT

## 2020-07-08 PROCEDURE — 97140 MANUAL THERAPY 1/> REGIONS: CPT

## 2020-07-08 PROCEDURE — 97112 NEUROMUSCULAR REEDUCATION: CPT

## 2020-07-08 NOTE — PROGRESS NOTES
Daily Note     Today's date: 2020  Patient name: Nader Saravia  : 1950  MRN: [id number]  Referring provider: Nadege Mills DO  Dx:   Encounter Diagnosis     ICD-10-CM    1  Left wrist pain M25 532                   Subjective: Pt  reports she has been baking for a yard sale and her hand is very sore  Pt  does note a decrease in tingling in fingers  Pt  does like her new brace  Objective: See treatment diary below      General Comments:      Wrist/Hand Comments  Pt noting tingling in her thumb and IF; PT issued a SA wrist support for nighttime   and when active during the day  AROM left wrist E/F- 60/55; Thumb MP- 0/60; IP- 0/75;opposition--2 0 cm  Strength-  II- 50/60; 3 ALISSON- ; Key-   Circumference at wrist- 15 5 cm; MPs- 19 0 cm; Thumb P1-5 6 cm; P1- 5 6 cm  Special tests- (+) FCR, (-) Tinel at wrist  Pt cont to note occasional tingling in her thumb and wrist area        Assessment: Tolerated treatment well  Patient exhibited good technique with therapeutic exercises and would benefit from continued PT  Tenderness noted thenar eminence with STM  Pt  given isotoner glove for edema management and improved ADL's  Plan: Progress treatment as tolerated  Precautions: avoid stressful or repetitive actiivity  Manuals 7/8 6/10 6/11 6/15 6/17 6/22 6/24 6/29 7/1  7/3   STM 15 15 15 15 15 15 15 15 15  15                           HEP  CC  isotoner  SA wrist support     KR       15                      15                           Neuro Re-Ed                      HP/pulsed biph 15 15 15 15 15 15 15 15 15  15                                                                                                                                                   Ther Ex                       Theraputty   T/Y 3'         T 2' T 2' T 2' T/Y 2'  T/Y 2'                 5 finger    T/Y  2/10         T 2/10 T 2/10 T 2/10 T/Y 2'  T/Y 2'   Farooq  20  2/10             20# 210 20 2/10  20 2/10   Blue  III  2/10             III 2/10 III 2/10  III 2/10   Flexbar  Y  2/10             Y 2/10 Y 2/10  Y 2/10   DB E/F  3  2/10             3# 2/10 3# 2/10  3 2/10   S/P  H  2/10               0# 2/10  --->                                                                                                                                                                         Modalities                       US  12 12 12 12 12 12 12 12 12  12   CP 15 15 15 15 15 15 15 15 15  15

## 2020-07-15 ENCOUNTER — OFFICE VISIT (OUTPATIENT)
Dept: PHYSICAL THERAPY | Facility: CLINIC | Age: 70
End: 2020-07-15
Payer: COMMERCIAL

## 2020-07-15 DIAGNOSIS — M25.532 LEFT WRIST PAIN: Primary | ICD-10-CM

## 2020-07-15 PROCEDURE — 97112 NEUROMUSCULAR REEDUCATION: CPT

## 2020-07-15 PROCEDURE — 97140 MANUAL THERAPY 1/> REGIONS: CPT

## 2020-07-15 PROCEDURE — 97110 THERAPEUTIC EXERCISES: CPT

## 2020-07-15 PROCEDURE — 97035 APP MDLTY 1+ULTRASOUND EA 15: CPT

## 2020-07-15 NOTE — PROGRESS NOTES
Daily Note     Today's date: 7/15/2020  Patient name: Christian Parr  : 1950  MRN: [id number]  Referring provider: Monika Peace DO  Dx:   Encounter Diagnosis     ICD-10-CM    1  Left wrist pain M25 532                   Subjective: Pt reports her wrist still "bothers me especially with increased activity  Objective: See treatment diary below    Wrist/Hand Comments  Pt noting tingling in her thumb and IF; PT issued a SA wrist support for nighttime   and when active during the day  AROM left wrist E/F- 60/55; Thumb MP- 0/60; IP- 0/75;opposition--2 0 cm  Strength-  II- 50/60; 3 ALISSON- ; Key-   Circumference at wrist- 15 5 cm; MPs- 19 0 cm; Thumb P1-5 6 cm; P1- 5 6 cm  Special tests- (+) FCR, (-) Tinel at wrist  Pt cont to note occasional tingling in her thumb and wrist area    Assessment: Tolerated treatment well today  Exercises added to address deficits  Minimal complaints  Tingling in digits continues especially with activity  Plan: Progress treatment as tolerated  Precautions: avoid stressful or repetitive actiivity  Manuals 7/8 7/15 6/11 6/15 6/17 6/22 6/24 6/29 7/1  7/3   STM 15 15 15 15 15 15 15 15 15  15                           HEP  CC  isotoner  SA wrist support     KR                             15                           Neuro Re-Ed                      HP/pulsed biph 15 15 15 15 15 15 15 15 15  15                                                                                                                                                   Ther Ex                       Theraputty   T/Y 3'  T/Y 3'       T 2' T 2' T 2' T/Y 2'  T/Y 2'                 5 finger    T/Y  2/10  T/Y 2/10       T 2/10 T 2/10 T 2/10 T/Y 2'  T/Y 2'   Farooq  20  2/10  20 2/10           20# 2/10 20 2/10  20 2/10   Blue  III  2/10  IV 2/10           III 2/10 III /10  III 2/10   Flexbar  Y  /10  R/Y 2/10           Y 2/10 Y 2/10  Y 2/10   DB E/F  3  2/10  3 2/10           3# 2/10 3# 2/10  3 2/10   S/P  H  2/10  H 2/10             0# 2/10  --->    Twister    20/20                                                                                                                                                                 Modalities                       US  12 12 12 12 12 12 12 12 12  12   CP 15 15 15 15 15 15 15 15 15  15

## 2020-07-20 ENCOUNTER — OFFICE VISIT (OUTPATIENT)
Dept: PHYSICAL THERAPY | Facility: CLINIC | Age: 70
End: 2020-07-20
Payer: COMMERCIAL

## 2020-07-20 DIAGNOSIS — M25.532 LEFT WRIST PAIN: Primary | ICD-10-CM

## 2020-07-20 PROCEDURE — 97035 APP MDLTY 1+ULTRASOUND EA 15: CPT

## 2020-07-20 PROCEDURE — 97140 MANUAL THERAPY 1/> REGIONS: CPT

## 2020-07-20 PROCEDURE — 97112 NEUROMUSCULAR REEDUCATION: CPT

## 2020-07-20 PROCEDURE — 97110 THERAPEUTIC EXERCISES: CPT

## 2020-07-20 NOTE — PROGRESS NOTES
Daily Note     Today's date: 2020  Patient name: Naresh Sky  : 1950  MRN: [id number]  Referring provider: Darlyn Reyna DO  Dx:   Encounter Diagnosis     ICD-10-CM    1  Left wrist pain M25 532                   Subjective: Pt reports some discomfort along IF base especially with activity  "My thumb has been feeling better "      Objective: See treatment diary below    Wrist/Hand Comments  Pt noting tingling in her thumb and IF; PT issued a SA wrist support for nighttime   and when active during the day  AROM left wrist E/F- 60/55; Thumb MP- 0/60; IP- 0/75;opposition--2 0 cm  Strength-  II- 50/60; 3 ALISSON- ; Key-   Circumference at wrist- 15 5 cm; MPs- 19 0 cm; Thumb P1-5 6 cm; P1- 5 6 cm  Special tests- (+) FCR, (-) Tinel at wrist  Pt cont to note occasional tingling in her thumb and wrist area        Assessment: Tolerated treatment well today  Pt responding fair to program  Pt cued to not "push through point of discomfort  Plan: Progress treatment as tolerated  Precautions: avoid stressful or repetitive actiivity  Manuals 7/8 7/15 7/20 6/15 6/17 6/22 6/24 6/29 7/1  7/3   STM 15 15 15 15 15 15 15 15 15  15                           HEP  CC  isotoner  SA wrist support     KR                             15                           Neuro Re-Ed                      HP/pulsed biph 15 15 15 15 15 15 15 15 15  15                                                                                                                                                   Ther Ex                       Theraputty   T/Y 3'  T/Y 3'  T/Y 3'     T 2' T 2' T 2' T/Y 2'  T/Y 2'                 5 finger    T/Y  210  T/Y 2/10  T/Y 2/10     T 2/10 T 2/10 T 2/10 T/Y 2'  T/Y 2'   Farooq  20  2/10  20 2/10  20 2/10         20# /10 20 2/10  20 2/10   Blue  III  2/10  IV 2/10  IV 2/10         III 2/10 III 2/10  III 2/10   Flexbar  Y  2/10  R/Y 2/10  R/Y 2/10         Y 2/10 Y 2/10  Y 2/10   DB E/F  3  2/10  3 2/10  3 2/10         3# 2/10 3# 2/10  3 2/10   S/P  H  2/10  H 2/10  H 2/10           0# 2/10  --->    Twister    20/20  20/20                                                                                                                                                               Modalities                       US  12 12 12 12 12 12 12 12 12  12   CP 15 15 15 15 15 15 15 15 15  15

## 2020-07-22 ENCOUNTER — OFFICE VISIT (OUTPATIENT)
Dept: PHYSICAL THERAPY | Facility: CLINIC | Age: 70
End: 2020-07-22
Payer: COMMERCIAL

## 2020-07-22 DIAGNOSIS — M25.532 LEFT WRIST PAIN: Primary | ICD-10-CM

## 2020-07-22 PROCEDURE — 97112 NEUROMUSCULAR REEDUCATION: CPT

## 2020-07-22 PROCEDURE — 97110 THERAPEUTIC EXERCISES: CPT

## 2020-07-22 PROCEDURE — 97035 APP MDLTY 1+ULTRASOUND EA 15: CPT

## 2020-07-22 PROCEDURE — 97140 MANUAL THERAPY 1/> REGIONS: CPT

## 2020-07-22 NOTE — PROGRESS NOTES
Daily Note     Today's date: 2020  Patient name: Rupa Peña  : 1950  MRN: [id number]  Referring provider: Alfonso Malcolm DO  Dx:   Encounter Diagnosis     ICD-10-CM    1  Left wrist pain M25 532                   Subjective: Pt reports her pain is intermittent in her IF  Overall improvement in symptoms  Objective: See treatment diary below    Wrist/Hand Comments  Pt noting tingling in her thumb and IF; PT issued a SA wrist support for nighttime   and when active during the day  AROM left wrist E/F- 60/55; Thumb MP- 0/60; IP- 0/75;opposition--2 0 cm  Strength-  II- 50/60; 3 ALISSON- ; Key-   Circumference at wrist- 15 5 cm; MPs- 19 0 cm; Thumb P1-5 6 cm; P1- 5 6 cm  Special tests- (+) FCR, (-) Tinel at wrist  Pt cont to note occasional tingling in her thumb and wrist area    Assessment: Tolerated treatment well today  Pain appears on ulnar side with wrist ext and supination activities  Pt instructed to not "push through ROM with discomfort  Plan: Progress treatment as tolerated  Precautions: avoid stressful or repetitive actiivity  Manuals 7/8 7/15 7/20 7/22 6/17 6/22 6/24 6/29 7/1  7/3   STM 15 15 15 15 15 15 15 15 15  15                           HEP  CC  isotoner  SA wrist support     KR                             15                           Neuro Re-Ed                      HP/pulsed biph 15 15 15 15 15 15 15 15 15  15                                                                                                                                                   Ther Ex                       Theraputty   T/Y 3'  T/Y 3'  T/Y 3'  T/Y 3'   T 2' T 2' T 2' T/Y 2'  T/Y 2'                 5 finger    T/Y  2/10  T/Y 2/10  T/Y 2/10  T/Y 2/10   T 2/10 T 2/10 T 2/10 T/Y 2'  T/Y 2'   Farooq  20  2/10  20 2/10  20 2/10  20 2/10       20# 2/10 20 2/10  20 2/10   Blue  III  2/10  IV 2/10  IV 2/10  IV 2/10       III 2/10 III 2/10  III 2/10   Flexbar  Y  2/10  R/Y 2/10  R/Y 2/10  R/Y       Y 2/10 Y 2/10  Y 2/10   DB E/F  3  2/10  3 2/10  3 2/10  3 2/10       3# 2/10 3# 2/10  3 2/10   S/P  H  2/10  H 2/10  H 2/10  H 2/10  0#       0# 2/10  --->    Twister    20/20  20/20  20/20                                                                                                                                                             Modalities                       US  12 12 12 12 12 12 12 12 12  12   CP 15 15 15 15 15 15 15 15 15  15

## 2020-07-27 ENCOUNTER — OFFICE VISIT (OUTPATIENT)
Dept: PHYSICAL THERAPY | Facility: CLINIC | Age: 70
End: 2020-07-27
Payer: COMMERCIAL

## 2020-07-27 DIAGNOSIS — M25.532 LEFT WRIST PAIN: Primary | ICD-10-CM

## 2020-07-27 PROCEDURE — 97110 THERAPEUTIC EXERCISES: CPT

## 2020-07-27 PROCEDURE — 97140 MANUAL THERAPY 1/> REGIONS: CPT

## 2020-07-27 PROCEDURE — 97112 NEUROMUSCULAR REEDUCATION: CPT

## 2020-07-27 PROCEDURE — 97035 APP MDLTY 1+ULTRASOUND EA 15: CPT

## 2020-07-27 NOTE — PROGRESS NOTES
Daily Note     Today's date: 2020  Patient name: Ron Amin  : 1950  MRN: [id number]  Referring provider: Jeff Angeles DO  Dx:   Encounter Diagnosis     ICD-10-CM    1  Left wrist pain M25 532                   Subjective: Pt reports the feeling of "tightness in my hand/fingers  Pt overworked it over the weekend  Objective: See treatment diary below    Wrist/Hand Comments  Pt noting tingling in her thumb and IF; PT issued a SA wrist support for nighttime   and when active during the day  AROM left wrist E/F- 60/55; Thumb MP- 0/60; IP- 0/75;opposition--2 0 cm  Strength-  II- 50/60; 3 ALISSON- ; Key-   Circumference at wrist- 15 5 cm; MPs- 19 0 cm; Thumb P1-5 6 cm; P1- 5 6 cm  Special tests- (+) FCR, (-) Tinel at wrist  Pt cont to note occasional tingling in her thumb and wrist area      Assessment: Tolerated treatment well today  Plan:  Continue with current program      Precautions: avoid stressful or repetitive actiivity  Manuals 7/8 7/15 7/20 7/22 7/27 6/22 6/24 6/29 7/1  7/3   STM 15 15 15 15 15 15 15 15 15  15                           HEP  CC  isotoner  SA wrist support     KR                             15                           Neuro Re-Ed                      HP/pulsed biph 15 15 15 15 15 15 15 15 15  15                                                                                                                                                   Ther Ex                       Theraputty   T/Y 3'  T/Y 3'  T/Y 3'  T/Y 3'  T/Y 3' T 2' T 2' T 2' T/Y 2'  T/Y 2'                 5 finger    T/Y  2/10  T/Y 2/10  T/Y 2/10  T/Y 2/10  T/Y 2/10 T 2/10 T 2/10 T 2/10 T/Y 2'  T/Y 2'   Farooq  20  2/10  20 2/10  20 2/10  20 2/10  20 2/10     20# 2/10 20 2/10  20 2/10   Blue  III  2/10  IV 2/10  IV 2/10  IV 2/10  IV 2/10     III 2/10 III 2/10  III 2/10   Flexbar  Y  2/10  R/Y 2/10  R/Y 2/10  R/Y  R/Y 2/10     Y 2/10 Y 2/10  Y 2/10   DB E/F  3  2/10  3 2/10  3 2/10  3 2/10  3 2/10     3# 2/10 3# 2/10  3 2/10   S/P  H  2/10  H 2/10  H 2/10  H 2/10  0# 2/10       0# 2/10  --->    Twister    20/20  20/20  20/20  20/20                                                                                                                                                           Modalities                       US  12 12 12 12 12 12 12 12 12  12   CP 15 15 15 15 15 15 15 15 15  15

## 2020-07-29 ENCOUNTER — OFFICE VISIT (OUTPATIENT)
Dept: PHYSICAL THERAPY | Facility: CLINIC | Age: 70
End: 2020-07-29
Payer: COMMERCIAL

## 2020-07-29 DIAGNOSIS — M25.532 LEFT WRIST PAIN: Primary | ICD-10-CM

## 2020-07-29 PROCEDURE — 97035 APP MDLTY 1+ULTRASOUND EA 15: CPT

## 2020-07-29 PROCEDURE — 97140 MANUAL THERAPY 1/> REGIONS: CPT

## 2020-07-29 PROCEDURE — 97112 NEUROMUSCULAR REEDUCATION: CPT

## 2020-07-29 PROCEDURE — 97110 THERAPEUTIC EXERCISES: CPT

## 2020-07-29 PROCEDURE — L3908 WHO COCK-UP NONMOLDE PRE OTS: HCPCS

## 2020-07-29 NOTE — PROGRESS NOTES
Daily Note     Today's date: 2020  Patient name: Dawood Gaxiola  : 1950  MRN: [id number]  Referring provider: Catina Mcdonald DO  Dx:   Encounter Diagnosis     ICD-10-CM    1  Left wrist pain M25 532                   Subjective: Pt reports the tingling has dissipated in her fingers  but discomfort along thumb flexor side  Objective: See treatment diary below      Wrist/Hand Comments  Pt noting tingling in her thumb and IF; PT issued a SA wrist support for nighttime   and when active during the day  AROM left wrist E/F- 60/55; Thumb MP- 0/60; IP- 0/75;opposition--2 0 cm  Strength-  II- 50/60; 3 ALISSON- ; Key-   Circumference at wrist- 15 5 cm; MPs- 19 0 cm; Thumb P1-5 6 cm; P1- 5 6 cm  Special tests- (+) FCR, (-) Tinel at wrist  Pt cont to note occasional tingling in her thumb and wrist area    Assessment: Tolerated treatment well today  [facility] thumb/wrist trialed today for support for functional activity  Plan: Progress treatment as tolerated  Continue with current program      Precautions: avoid stressful or repetitive actiivity  Manuals 7/8 7/15 7/20 7/22 7/27 7/29 6/24 6/29 7/1  7/3   STM 15 15 15 15 15 15 15 15 15  15                           HEP  CC  isotoner  SA wrist support     KR                             15   [address]  wrist/thumb            LMed           Neuro Re-Ed                      HP/pulsed biph 15 15 15 15 15 15 15 15 15  15                                                                                                                                                   Ther Ex                       Theraputty   T/Y 3'  T/Y 3'  T/Y 3'  T/Y 3'  T/Y 3' T/Y 2' T 2' T 2' T/Y 2'  T/Y 2'                 5 finger    T/Y  2/10  T/Y 2/10  T/Y 2/10  T/Y 2/10  T/Y 2/10 T/Y 2/10 T 2/10 T /10 T/Y 2'  T/Y 2'   Farooq  20  2/10  20 2/10  20 2/10  20 2/10  20 2/10  20 2/10   20# 2/10 20 2/10  20 2/10   Blue  III  /10  IV 2/10  IV 2/10  IV 2/10  IV 2/10  IV 2/10   III 2/10 III 2/10  III 2/10   Flexbar  Y  2/10  R/Y 2/10  R/Y 2/10  R/Y  R/Y 2/10  R/Y 2/10   Y 2/10 Y 2/10  Y 2/10   DB E/F  3  2/10  3 2/10  3 2/10  3 2/10  3 2/10  3 2/10   3# 2/10 3# 2/10  3 2/10   S/P  H  2/10  H 2/10  H 2/10  H 2/10  0# 2/10  0# 2/10     0# 2/10  --->    Twister    20/20  20/20  20/20  20/20  20/20                                                                                                                                                         Modalities                       US  12 12 12 12 12 12 12 12 12  12   CP 15 15 15 15 15 15 15 15 15  15

## 2020-07-31 DIAGNOSIS — M96.1 POSTLAMINECTOMY SYNDROME, NOT ELSEWHERE CLASSIFIED: ICD-10-CM
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
## 2020-07-31 DIAGNOSIS — M54.17 RADICULOPATHY, LUMBOSACRAL REGION: ICD-10-CM

## 2020-07-31 DIAGNOSIS — R20.0 ANESTHESIA OF SKIN: ICD-10-CM

## 2020-07-31 DIAGNOSIS — R06.02 SHORTNESS OF BREATH: ICD-10-CM
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
## 2020-08-03 ENCOUNTER — OFFICE VISIT (OUTPATIENT)
Dept: PHYSICAL THERAPY | Facility: CLINIC | Age: 70
End: 2020-08-03
Payer: COMMERCIAL

## 2020-08-03 DIAGNOSIS — M25.532 LEFT WRIST PAIN: Primary | ICD-10-CM

## 2020-08-03 PROCEDURE — 97035 APP MDLTY 1+ULTRASOUND EA 15: CPT

## 2020-08-03 PROCEDURE — 97110 THERAPEUTIC EXERCISES: CPT

## 2020-08-03 PROCEDURE — 97112 NEUROMUSCULAR REEDUCATION: CPT

## 2020-08-03 PROCEDURE — 97140 MANUAL THERAPY 1/> REGIONS: CPT

## 2020-08-03 NOTE — PROGRESS NOTES
Daily Note     Today's date: 8/3/2020  Patient name: Mayda Arboleda  : 1950  MRN: [id number]  Referring provider: Noé Hewitt DO  Dx:   Encounter Diagnosis     ICD-10-CM    1  Left wrist pain  M25 532                   Subjective: Pt  reports her wrist, base of thumb are very sore today  Pt  overdid it this weekend as her [employer] had a bizarre  Objective: See treatment diary below      Wrist/Hand Comments  Pt noting tingling in her thumb and IF; PT issued a SA wrist support for nighttime   and when active during the day  AROM left wrist E/F- 60/55; Thumb MP- 0/60; IP- 0/75;opposition--2 0 cm  Strength-  II- 50/60; 3 ALISSON- ; Key-   Circumference at wrist- 15 5 cm; MPs- 19 0 cm; Thumb P1-5 6 cm; P1- 5 6 cm  Special tests- (+) FCR, (-) Tinel at wrist  Pt cont to note occasional tingling in her thumb and wrist area      Assessment: Tolerated treatment fair  Patient exhibited good technique with therapeutic exercises and would benefit from continued PT  Noted swelling t/o hand  Advised pt  to ice to decrease inflammation  Pt  unable to perform Farooq and Blue secondary to pain  Will resume as able  Plan: Continue per plan of care  Continue with current program      Precautions: avoid stressful or repetitive actiivity  Manuals 7/8 7/15 7/20 7/22 7/27 7/29 8/3 6/29 7/1  7/3   STM 15 15 15 15 15 15 15 15 15  15                           HEP  CC  isotoner  SA wrist support     KR                             15   [address]  wrist/thumb            LMed           Neuro Re-Ed                      HP/pulsed biph 15 15 15 15 15 15 15 15 15  15                                                                                                                                                   Ther Ex                       Theraputty   T/Y 3'  T/Y 3'  T/Y 3'  T/Y 3'  T/Y 3' T/Y 2' T/Y 2' T 2' T/Y 2'  T/Y 2'                 5 finger    T/Y  2/10  T/Y 2/10  T/Y 2/10  T/Y 210  T/Y 2/10 T/Y 2/10 T/Y 2/10 T 2/10 T/Y 2'  T/Y 2'   Farooq  20  2/10  20 2/10  20 2/10  20 2/10  20 2/10  20 2/10 unable   20# 2/10 20 2/10  20 2/10   Blue  III  2/10  IV 2/10  IV 2/10  IV 2/10  IV 2/10  IV 2/10  unable   III 2/10 III 2/10  III 2/10   Flexbar  Y  2/10  R/Y 2/10  R/Y 2/10  R/Y  R/Y 2/10  R/Y 2/10  R/Y  2/10 Y 2/10 Y 2/10  Y 2/10   DB E/F  3  2/10  3 2/10  3 2/10  3 2/10  3 2/10  3 2/10  3  2/10 3# 2/10 3# 2/10  3 2/10   S/P  H  2/10  H 2/10  H 2/10  H 2/10  0# 2/10  0# 2/10  0  2/10   0# 2/10  --->    Twister    20/20  20/20  20/20  20/20  20/20  20/20                                                                                                                                                       Modalities                       US  12 12 12 12 12 12 12 12 12  12   CP 15 15 15 15 15 15 15 15 15  15

## 2020-08-05 ENCOUNTER — OFFICE VISIT (OUTPATIENT)
Dept: PHYSICAL THERAPY | Facility: CLINIC | Age: 70
End: 2020-08-05
Payer: COMMERCIAL

## 2020-08-05 DIAGNOSIS — M25.532 LEFT WRIST PAIN: Primary | ICD-10-CM

## 2020-08-05 PROCEDURE — 97035 APP MDLTY 1+ULTRASOUND EA 15: CPT

## 2020-08-05 PROCEDURE — 97140 MANUAL THERAPY 1/> REGIONS: CPT

## 2020-08-05 NOTE — PROGRESS NOTES
Daily Note     Today's date: 2020  Patient name: Naty Henriquez  : 1950  MRN: [id number]  Referring provider: Lalo Marie DO  Dx:   Encounter Diagnosis     ICD-10-CM    1  Left wrist pain  M25 532                   Subjective: Pt  reports as soon as she starts to move her hand, it starts to "fall asleep " Pt  Is wearing her old splint for increased protection  Objective: See treatment diary below        Wrist/Hand Comments  Pt noting tingling in her thumb and IF; PT issued a SA wrist support for nighttime   and when active during the day  AROM left wrist E/F- 60/55; Thumb MP- 0/60; IP- 0/75;opposition--2 0 cm  Strength-  II- 50/60; 3 ALISSON- ; Key-   Circumference at wrist- 15 5 cm; MPs- 19 0 cm; Thumb P1-5 6 cm; P1- 5 6 cm  Special tests- (+) FCR, (-) Tinel at wrist  Pt cont to note occasional tingling in her thumb and wrist area    Assessment: Tolerated treatment well  Patient exhibited good technique with therapeutic exercises and would benefit from continued PT  Pt  Able to compete POC despite c/o pain  Plan: Progress treatment as tolerated  Continue with current program      Precautions: avoid stressful or repetitive actiivity  Manuals 7/8 7/15 7/20 7/22 7/27 7/29 8/3 8/5 7/1  7/3   STM 15 15 15 15 15 15 15 15 15  15                           HEP  CC  isotoner  SA wrist support     KR                             15   [address]  wrist/thumb            LMed           Neuro Re-Ed                      HP/pulsed biph 15 15 15 15 15 15 15 15 15  15                                                                                                                                                   Ther Ex                       Theraputty   T/Y 3'  T/Y 3'  T/Y 3'  T/Y 3'  T/Y 3' T/Y 2' T/Y 2' T 2' T/Y 2'  T/Y 2'                 5 finger    T/Y  2/10  T/Y 2/10  T/Y 2/10  T/Y 2/10  T/Y 2/10 T/Y 2/10 T/Y 2/10 T 2/10 T/Y 2'  T/Y 2'   Farooq  20  2/10  20 2/10  20 2/10  20 2/10  20 2/10  20 2/10 unable   20# 2/10 20 2/10  20 2/10   Blue  III  2/10  IV 2/10  IV 2/10  IV 2/10  IV 2/10  IV 2/10  unable   III 2/10 III 2/10  III 2/10   Flexbar  Y  2/10  R/Y 2/10  R/Y 2/10  R/Y  R/Y 2/10  R/Y 2/10  R/Y  2/10 Y 2/10 Y 2/10  Y 2/10   DB E/F  3  2/10  3 2/10  3 2/10  3 2/10  3 2/10  3 2/10  3  2/10 3# 2/10 3# 2/10  3 2/10   S/P  H  2/10  H 2/10  H 2/10  H 2/10  0# 2/10  0# 2/10  0  2/10  H  2/10 0# 2/10  --->    Twister    20/20  20/20  20/20  20/20  20/20  20/20  20/20                                                                                                                                                     Modalities                       US  12 12 12 12 12 12 12 12 12  12   CP 15 15 15 15 15 15 15 15 15  15

## 2020-08-10 ENCOUNTER — OFFICE VISIT (OUTPATIENT)
Dept: PHYSICAL THERAPY | Facility: CLINIC | Age: 70
End: 2020-08-10
Payer: COMMERCIAL

## 2020-08-10 DIAGNOSIS — M25.532 LEFT WRIST PAIN: Primary | ICD-10-CM

## 2020-08-10 PROCEDURE — 97140 MANUAL THERAPY 1/> REGIONS: CPT

## 2020-08-10 PROCEDURE — 97035 APP MDLTY 1+ULTRASOUND EA 15: CPT

## 2020-08-10 PROCEDURE — 97110 THERAPEUTIC EXERCISES: CPT

## 2020-08-10 PROCEDURE — 97112 NEUROMUSCULAR REEDUCATION: CPT

## 2020-08-10 NOTE — LETTER
2020    Debbie Kaminski DO  [address] [address]    Patient: Lashawn Jolly   YOB: 1950   Date of Visit: 8/10/2020     Encounter Diagnosis     ICD-10-CM    1  Left wrist pain  M25 532        Dear Dr Alicia Anderson: Thank you for your recent referral of Lashawn Jolly  Please review the attached evaluation summary from Serenity's recent visit  Please verify that you agree with the plan of care by signing the attached order  If you have any questions or concerns, please do not hesitate to call  I sincerely appreciate the opportunity to share in the care of one of your patients and hope to have another opportunity to work with you in the near future  Sincerely,    Agapito Hester, SALLYT, CHT    Referring Provider:      I certify that I have read the below Plan of Care and certify the need for these services furnished under this plan of treatment while under my care  Debbie Kaminski DO  [address] [place] [id number]  VIA Facsimile: [phone]                   Daily Note     Today's date: 8/10/2020  Patient name: Lashawn Jolly  : 1950  MRN: [id number]  Referring provider: Eduardo Torres DO  Dx: No diagnosis found  Subjective: Pt reports the [facility] cool did not yield good results  She states it has been feeling better since the weekend  Objective: See treatment diary below    Wrist/Hand Comments  Pt noting tingling in her thumb and IF; PT issued a SA wrist support for nighttime   and when active during the day  AROM left wrist E/F- 60/55; Thumb MP- 0/60; IP- 0/75;opposition--2 0 cm  Strength-  II- 50/60; 3 ALISSON- /; Key-   Circumference at wrist- 15 5 cm; MPs- 19 0 cm; Thumb P1-5 6 cm; P1- 5 6 cm  Special tests- (+) FCR, (-) Tinel at wrist  Pt cont to note occasional tingling in her thumb and wrist area      Assessment: Tolerated treatment well today  Pt with less symptoms during session  Supination provided discomfort around thumb with load  Plan: Progress treatment as tolerated  Continue with current program      Precautions: avoid stressful or repetitive actiivity  Manuals 7/8 7/15 7/20 7/22 7/27 7/29 8/3 8/5 8/10  7/3   STM 15 15 15 15 15 15 15 15 15  15                           HEP  CC  isotoner  SA wrist support     KR                             15   [address]  wrist/thumb            LMed           Neuro Re-Ed                      HP/pulsed biph 15 15 15 15 15 15 15 15 15  15                                                                                                                                                   Ther Ex                       Theraputty   T/Y 3'  T/Y 3'  T/Y 3'  T/Y 3'  T/Y 3' T/Y 2' T/Y 2' T 2' T/Y 2'  T/Y 2'                 5 finger   T/Y  2/10  T/Y 2/10  T/Y 2/10  T/Y 2/10  T/Y 2/10 T/Y 2/10 T/Y 2/10 T 2/10 T/Y 2'  T/Y 2'   Farooq  20  2/10  20 2/10  20 2/10  20 2/10  20 2/10  20 2/10 unable   20# 2/10 20 2/10  20 2/10   Blue  III  2/10  IV 2/10  IV 2/10  IV 2/10  IV 2/10  IV 2/10  unable   III 2/10 III 2/10  III 2/10   Flexbar  Y  2/10  R/Y 2/10  R/Y 2/10  R/Y  R/Y 2/10  R/Y 2/10  R/Y  2/10 Y 2/10 Y 2/10  Y 2/10   DB E/F  3  2/10  3 2/10  3 2/10  3 2/10  3 2/10  3 2/10  3  2/10 3# 2/10 3# 2/10  3 2/10   S/P  H  2/10  H 2/10  H 2/10  H 2/10  0# 2/10  0# 2/10  0  2/10  H  2/10 0# 2/10  --->    Twister    20/20  20/20  20/20  20/20  20/20  20/20  20/20  20/20                                                                                                                                                   Modalities                       US  12 12 12 12 12 12 12 12 12  12   CP 15 15 15 15 15 15 15 15 15  15                                   Attestation signed by Jas Russell, PT at 8/11/2020 10:39 AM:  I supervised the visit    We discussed the case to ensure appropriate continuation and progression of care and I reviewed the documentation  PT Re-Evaluation     Today's date: 8/10/2020  Patient name: Salinas Canseco  : 1950  MRN: [id number]  Referring provider: Colletta Mohair, DO  Dx:   Encounter Diagnosis     ICD-10-CM    1  Left wrist pain  M25 532        Start Time: 1330  Stop Time: 1445  Total time in clinic (min): 75 minutes    Assessment  Assessment details: Pt is a 70 YO female presenting to PT with pain, decreased AROM, strength and tolerance to activity  Pt would benefit from skilled intervention to address these issues and maximize overall function  Occupation- retired  Dominant- left; Involved- left  Pt notes less pain with improved motion  She has been caution to avoid repetitive or strong grasp with activity  Goals  ST  Decrease pain to 2-3/10 in 4 weeks            2  Decrease swelling in fingers and wrist            3  Increase AROM to composite fist and extension            4   Provide compression for support  LT  Increase functional motion and strength for independence with ADL and self care by DC            2   Ability to RT recreational activity by DC    Plan  Patient would benefit from: skilled physical therapy  Planned modality interventions: thermotherapy: hydrocollator packs, cryotherapy and ultrasound  Planned therapy interventions: manual therapy, neuromuscular re-education, orthotic fitting/training, strengthening, stretching, therapeutic activities, therapeutic exercise and home exercise program  Frequency: 3x week  Duration in weeks: 4  Treatment plan discussed with: patient        Subjective Evaluation    History of Present Illness  Mechanism of injury: Progressive worsening of symptoms from repetitive use while making COVID masks  Pain  Current pain ratin  At best pain ratin  At worst pain rating: 3  Location: left thumb IF, MF and volar wrist    Hand dominance: left    Treatments  Current treatment: physical therapy  Patient Goals  Patient goals for therapy: decreased edema, decreased pain, increased strength, independence with ADLs/IADLs, return to sport/leisure activities and increased motion          Objective     General Comments:      Wrist/Hand Comments  Pt noting tingling in her thumb and IF; PT issued a SA wrist support for nighttime   and when active during the day  AROM left wrist E/F- 60/55; Thumb MP- 0/60; IP- 0/75;opposition--2 0 cm  Strength-  II- 50/60; 3 ALISSON- 8/14; Key- 12/14  Circumference at wrist- 15 5 cm; MPs- 19 0 cm; Thumb P1-5 6 cm; P1- 5 6 cm  Special tests- (+) FCR, (-) Tinel at wrist  Pt cont to note occasional tingling in her thumb and wrist area                Precautions: avoid stressful or repetitive actiivity    Manuals 7/8 7/15 7/20 7/22 7/27 7/29 8/3 8/5 8/10    STM 15 15 15 15 15 15 15 15 15  15                           HEP  CC  isotoner  SA wrist support       KR                                   15   [address]  wrist/thumb            LMed           Neuro Re-Ed                       HP/pulsed biph 15 15 15 15 15 15 15 15 15  15                                                                                                                                                   Ther Ex                       Theraputty   T/Y 3'  T/Y 3'  T/Y 3'  T/Y 3'  T/Y 3' T/Y 2' T/Y 2' T 2' T/Y 2'  T/Y 2'                 5 finger    T/Y  2/10  T/Y 2/10  T/Y 2/10  T/Y 2/10  T/Y 2/10 T/Y 2/10 T/Y 2/10 T 2/10 T/Y 2'  T/Y 2'   Farooq  20  2/10  20 2/10  20 2/10  20 2/10  20 2/10  20 2/10 unable    20# 2/10 20 2/10  20 2/10   Blue  III  2/10  IV 2/10  IV 2/10  IV 2/10  IV 2/10  IV 2/10  unable    III 2/10 III 2/10  III 2/10   Flexbar  Y  2/10  R/Y 2/10  R/Y 2/10  R/Y  R/Y 2/10  R/Y 2/10  R/Y  2/10 Y 2/10 Y 2/10  Y 2/10   DB E/F  3  2/10  3 2/10  3 2/10  3 2/10  3 2/10  3 2/10  3  2/10 3# 2/10 3# 2/10  3 2/10   S/P  H  2/10  H 2/10  H 2/10  H 2/10  0# 2/10  0# 2/10  0  2/10  H  2/10 0# 2/10  --->    Twister    20/20  20/20  20/20  20/20  20/20  20/20  20/20  20/20                                                                                                                                                     Modalities                       US  12 12 12 12 12 12 12 12 12  12   CP 15 15 15 15 15 15 15 15 15  15

## 2020-08-12 ENCOUNTER — OFFICE VISIT (OUTPATIENT)
Dept: PHYSICAL THERAPY | Facility: CLINIC | Age: 70
End: 2020-08-12
Payer: COMMERCIAL

## 2020-08-12 DIAGNOSIS — M25.532 LEFT WRIST PAIN: Primary | ICD-10-CM

## 2020-08-12 PROCEDURE — 97140 MANUAL THERAPY 1/> REGIONS: CPT

## 2020-08-12 PROCEDURE — 97035 APP MDLTY 1+ULTRASOUND EA 15: CPT

## 2020-08-12 PROCEDURE — 97110 THERAPEUTIC EXERCISES: CPT

## 2020-08-12 PROCEDURE — 97112 NEUROMUSCULAR REEDUCATION: CPT

## 2020-08-12 NOTE — PROGRESS NOTES
Daily Note     Today's date: 2020  Patient name: Christian Parr  : 1950  MRN: [id number]  Referring provider: Monika Peace DO  Dx:   Encounter Diagnosis     ICD-10-CM    1  Left wrist pain  M25 532                   Subjective: Pt  reported less soreness today in distal thumb  She has been wearing her wrist brace at night and partly during the day, as she thinks its decreasing her pain  Objective: See treatment diary below    Wrist/Hand Comments  Pt noting tingling in her thumb and IF; PT issued a SA wrist support for nighttime   and when active during the day  AROM left wrist E/F- 60/55; Thumb MP- 0/60; IP- 0/75;opposition--2 0 cm  Strength-  II- 50/60; 3 ALISSON- ; Key-   Circumference at wrist- 15 5 cm; MPs- 19 0 cm; Thumb P1-5 6 cm; P1- 5 6 cm  Special tests- (+) FCR, (-) Tinel at wrist  Pt cont to note occasional tingling in her thumb and wrist area        Assessment: Tolerated treatment well  Patient would benefit from continued PT  Pain noticed with supination but was still able to perform the exercise with modifications instructed by PTA  Pt  progressed today without symptom increase  Plan: Continue per plan of care  Continue with current program      Precautions: avoid stressful or repetitive actiivity  Manuals 7/8 7/15 7/20 7/22 7/27 7/29 8/3 8/5 8/10  8/12   STM 15 15 15 15 15 15 15 15 15  15                           HEP  CC  isotoner  SA wrist support     KR                                [address]  wrist/thumb            LMed           Neuro Re-Ed                      HP/pulsed biph 15 15 15 15 15 15 15 15 15  15                                                                                                                                                   Ther Ex                       Theraputty   Y/O 3'  T/Y 3'  T/Y 3'  T/Y 3'  T/Y 3' T/Y 2' T/Y 2' T 2' T/Y 2'  T/Y 2'                 5 finger    Y/O  2/10  T/Y 2/10  T/Y 2/10  T/Y 2/10  T/Y 2/10 T/Y 2/10 T/Y 2/10 T 2/10 T/Y 2'  T/Y 2'   Farooq  20  2/10  20 2/10  20 2/10  20 2/10  20 2/10  20 2/10 unable   20# 2/10 20 2/10  25# 2/10   Blue  III  2/10  IV 2/10  IV 2/10  IV 2/10  IV 2/10  IV 2/10  unable   III 2/10 III 2/10  III 2/10   Flexbar  Y  2/10  R/Y 2/10  R/Y 2/10  R/Y  R/Y 2/10  R/Y 2/10  R/Y  2/10 Y 2/10 Y 2/10  R/Y 2/10   DB E/F  3  2/10  3 2/10  3 2/10  3 2/10  3 2/10  3 2/10  3  2/10 3# 2/10 3# 2/10  3# 2/10   S/P  H  2/10  H 2/10  H 2/10  H 2/10  0# 2/10  0# 2/10  0  2/10  H  2/10 0# 2/10  0#  2/10    Twister    20/20  20/20  20/20  20/20  20/20  20/20  20/20  20/20  20/20   Comp                     Y 2'                                                                                                                          Modalities                       US  12 12 12 12 12 12 12 12 12  12   CP 13 15 13 15 13 15 15 15 15  15

## 2020-08-17 ENCOUNTER — OFFICE VISIT (OUTPATIENT)
Dept: PHYSICAL THERAPY | Facility: CLINIC | Age: 70
End: 2020-08-17
Payer: COMMERCIAL

## 2020-08-17 DIAGNOSIS — M25.532 LEFT WRIST PAIN: Primary | ICD-10-CM

## 2020-08-17 PROCEDURE — 97110 THERAPEUTIC EXERCISES: CPT

## 2020-08-17 PROCEDURE — 97112 NEUROMUSCULAR REEDUCATION: CPT

## 2020-08-17 PROCEDURE — 97140 MANUAL THERAPY 1/> REGIONS: CPT

## 2020-08-17 PROCEDURE — 97035 APP MDLTY 1+ULTRASOUND EA 15: CPT

## 2020-08-17 NOTE — PROGRESS NOTES
Daily Note     Today's date: 2020  Patient name: Asa Dumont  : 1950  MRN: [id number]  Referring provider: Agustin William DO  Dx:   Encounter Diagnosis     ICD-10-CM    1  Left wrist pain  M25 532                   Subjective: Pt  reports she has been wearing the brace at night and day, she feels good while wearing it  Objective: See treatment diary below    Wrist/Hand Comments  Pt noting tingling in her thumb and IF; PT issued a SA wrist support for nighttime   and when active during the day  AROM left wrist E/F- 60/55; Thumb MP- 0/60; IP- 0/75;opposition--2 0 cm  Strength-  II- 50/60; 3 ALISSON- ; Key-   Circumference at wrist- 15 5 cm; MPs- 19 0 cm; Thumb P1-5 6 cm; P1- 5 6 cm  Special tests- (+) FCR, (-) Tinel at wrist  Pt cont to note occasional tingling in her thumb and wrist area      Assessment: Tolerated treatment well  Patient would benefit from continued PT  Pt  progressed with no complaints  Plan: Continue per plan of care  Continue with current program      Precautions: avoid stressful or repetitive actiivity  Manuals 8/17 7/15 7/20 7/22 7/27 7/29 8/3 8/5 8/10  8/12   STM 15 15 15 15 15 15 15 15 15  15                           HEP  CC  isotoner  SA wrist support                                     [address]  wrist/thumb            LMed           Neuro Re-Ed                      HP/pulsed biph 15 15 15 15 15 15 15 15 15  15                                                                                                                                                   Ther Ex                       Theraputty   T/Y 3'  T/Y 3'  T/Y 3'  T/Y 3'  T/Y 3' T/Y 2' T/Y 2' T 2' T/Y 2'  T/Y 2'                 5 finger    T/Y  2/10  T/Y 2/10  T/Y 2/10  T/Y 2/10  T/Y 2/10 T/Y 2/10 T/Y 2/10 T 2/10 T/Y 2'  T/Y 2'   Farooq  25#  2/10  20 2/10  20 2/10  20 2/10  20 2/10  20 2/10 unable   20# 2/10 20 2/10  25# 2/10   Blue  IV  2/10  IV 2/10  IV 2/10  IV 2/10  IV 2/10  IV 2/10  unable   III 2/10 III 2/10  III 2/10   Flexbar  R/Y  2/10  R/Y 2/10  R/Y 2/10  R/Y  R/Y 2/10  R/Y 2/10  R/Y  2/10 Y 2/10 Y 2/10  R/Y 2/10   DB E/F  4#  2/10  3 2/10  3 2/10  3 2/10  3 2/10  3 2/10  3  2/10 3# 2/10 3# 2/10  3# 2/10   S/P  H  2/10  H 2/10  H 2/10  H 2/10  0# 2/10  0# 2/10  0  2/10  H  2/10 0# 2/10  0#  2/10    Twister  20/20  20/20  20/20  20/20  20/20  20/20  20/20  20/20  20/20  20/20   Comp   Y 2'                  Y 2'                                                                                                                          Modalities                       US  12 12 12 12 12 12 12 12 12  12   CP 15 15 13 15 13 15 15 15 15  15

## 2020-08-19 ENCOUNTER — OFFICE VISIT (OUTPATIENT)
Dept: PHYSICAL THERAPY | Facility: CLINIC | Age: 70
End: 2020-08-19
Payer: COMMERCIAL

## 2020-08-19 DIAGNOSIS — M25.532 LEFT WRIST PAIN: Primary | ICD-10-CM

## 2020-08-19 PROCEDURE — 97035 APP MDLTY 1+ULTRASOUND EA 15: CPT

## 2020-08-19 PROCEDURE — 97140 MANUAL THERAPY 1/> REGIONS: CPT

## 2020-08-19 PROCEDURE — 97112 NEUROMUSCULAR REEDUCATION: CPT

## 2020-08-19 PROCEDURE — 97110 THERAPEUTIC EXERCISES: CPT

## 2020-08-19 NOTE — PROGRESS NOTES
Daily Note     Today's date: 2020  Patient name: Lucy Montaño  : 1950  MRN: [id number]  Referring provider: Casper Wong DO  Dx:   Encounter Diagnosis     ICD-10-CM    1  Left wrist pain  M25 532                   Subjective: Pt  reports L wrist has been feeling better since wearing the brace  Objective: See treatment diary below    Wrist/Hand Comments  Pt noting tingling in her thumb and IF; PT issued a SA wrist support for nighttime   and when active during the day  AROM left wrist E/F- 60/55; Thumb MP- 0/60; IP- 0/75;opposition--2 0 cm  Strength-  II- 50/60; 3 ALISSON- ; Key-   Circumference at wrist- 15 5 cm; MPs- 19 0 cm; Thumb P1-5 6 cm; P1- 5 6 cm  Special tests- (+) FCR, (-) Tinel at wrist  Pt cont to note occasional tingling in her thumb and wrist area      Assessment: Tolerated treatment well  Patient would benefit from continued PT  Noted dorsal forearm tender with STM  Still limited in supination with discomfort  Plan: Continue per plan of care  Progress treatment as tolerated  Continue with current program      Precautions: avoid stressful or repetitive actiivity  Manuals 8/17 8/19 7/20 7/22 7/27 7/29 8/3 8/5 8/10  8/12   STM 15 15 15 15 15 15 15 15 15  15                           HEP  CC  isotoner  SA wrist support                                     [address]  wrist/thumb            LMed           Neuro Re-Ed                      HP/pulsed biph 15 15 15 15 15 15 15 15 15  15                                                                                                                                                   Ther Ex                       Theraputty   T/Y 3'  T/Y 3'  T/Y 3'  T/Y 3'  T/Y 3' T/Y 2' T/Y 2' T 2' T/Y 2'  T/Y 2'                 5 finger    T/Y  2/10  T/Y 2/10  T/Y 2/10  T/Y 2/10  T/Y 2/10 T/Y 2/10 T/Y 2/10 T 2/10 T/Y 2'  T/Y 2'   Farooq  25#  2/10  25# 2/10  20 2/10  20 2/10  20 2/10  20 2/10 unable   20# 2/10 20 2/10  25# 2/10   Blue  IV  2/10  IlV 2/10  IV 2/10  IV 2/10  IV 2/10  IV 2/10  unable   III 2/10 III 2/10  III 2/10   Flexbar  R/Y  2/10  R/Y 2/10  R/Y 2/10  R/Y  R/Y 2/10  R/Y 2/10  R/Y  2/10 Y 2/10 Y 2/10  R/Y 2/10   DB E/F  4#  2/10  4# 2/10  3 2/10  3 2/10  3 2/10  3 2/10  3  2/10 3# 2/10 3# 2/10  3# 2/10   S/P  H  2/10  H 2/10  H 2/10  H 2/10  0# 2/10  0# 2/10  0  2/10  H  2/10 0# 2/10  0#  2/10    Twister  20/20  20/20  20/20  20/20  20/20  20/20  20/20  20/20  20/20  20/20   Comp   Y 2'  Y 2'                Y 2'                                                                                                                          Modalities                       US  12 12 12 12 12 12 12 16 16  12   CP 13 15 13 17 15 13 15 15 15  15

## 2020-08-24 ENCOUNTER — OFFICE VISIT (OUTPATIENT)
Dept: PHYSICAL THERAPY | Facility: CLINIC | Age: 70
End: 2020-08-24
Payer: COMMERCIAL

## 2020-08-24 DIAGNOSIS — M25.532 LEFT WRIST PAIN: Primary | ICD-10-CM

## 2020-08-24 PROCEDURE — 97112 NEUROMUSCULAR REEDUCATION: CPT | Performed by: PHYSICAL THERAPIST

## 2020-08-24 PROCEDURE — 97140 MANUAL THERAPY 1/> REGIONS: CPT | Performed by: PHYSICAL THERAPIST

## 2020-08-24 PROCEDURE — 97035 APP MDLTY 1+ULTRASOUND EA 15: CPT | Performed by: PHYSICAL THERAPIST

## 2020-08-24 PROCEDURE — 97110 THERAPEUTIC EXERCISES: CPT | Performed by: PHYSICAL THERAPIST

## 2020-08-24 NOTE — PROGRESS NOTES
Daily Note     Today's date: 2020  Patient name: Mayda Arboleda  : 1950  MRN: [id number]  Referring provider: Noé Hewitt DO  Dx:   Encounter Diagnosis     ICD-10-CM    1  Left wrist pain  M25 532                   Subjective: pt notes less pain and improved ability to use at home  She has been resting and avoiding overuse  Objective: See treatment diary below    Pt noting tingling in her thumb and IF; PT issued a SA wrist support for nighttime   and when active during the day     AROM left wrist E/F- 60/55;                    Thumb MP- 0/60; IP- 0/75;opposition--2 0 cm  Strength-  II- 50/60; 3 ALISSON- ; Key-   Circumference at wrist- 15 5 cm; MPs- 19 0 cm; Thumb P1-5 6 cm; P1- 5 6 cm  Special tests- (+) FCR, (-) Tinel at wrist  Pt cont to note occasional tingling in her thumb and wrist area    Assessment: Tolerated treatment well  Patient would benefit from continued PT      Plan: Continue per plan of care  Continue with current program      Precautions: avoid stressful or repetitive actiivity  Manuals           STM 15 15 15 15 15 15 15 15 15  15                           HEP  CC  isotoner  SA wrist support                                     [address]  wrist/thumb            LMed           Neuro Re-Ed                      HP/pulsed biph 15 15 15 15 15 15 15 15 15  15                                                                                                                                                   Ther Ex                       Theraputty   T/Y 3'  T/Y 3'  O 3'  T/Y 3'  T/Y 3' T/Y 2' T/Y 2' T 2' T/Y 2'  T/Y 2'                 5 finger    T/Y  2/10  T/Y 2/10  O 2/10  T/Y 2/10  T/Y 2/10 T/Y 2/10 T/Y 2/10 T 2/10 T/Y 2'  T/Y 2'   Farooq  25#  2/10  25# 2/10  25 2/10  20 2/10  20 2/10  20 2/10 unable   20# 2/10 20 2/10  25# 2/10   Blue  IV  2/10  lV 2/10  IV 2/10  IV 2/10  IV 2/10  IV 2/10  unable   III 2/10 III 2/10  III 2/10   Flexbar  R/Y  2/10  R/Y 2/10  R/Y 2/10  R/Y  R/Y 2/10  R/Y 2/10  R/Y  2/10 Y 2/10 Y 2/10  R/Y 2/10   DB E/F  4#  2/10  4# 2/10  4 2/10  3 2/10  3 2/10  3 2/10  3  2/10 3# 2/10 3# 2/10  3# 2/10   S/P  H  2/10  H 2/10  H 2/10  H 2/10  0# 2/10  0# 2/10  0  2/10  H  2/10 0# 2/10  0#  2/10    Twister  20/20  20/20  20/20  20/20  20/20  20/20  20/20  20/20  20/20  20/20   Comp   Y 2'  Y 2'  Y 2'              Y 2'                                                                                                                          Modalities                       US  12 12 12 dc dc        CP 15 15 15 15 15 15 15 15 15  15

## 2020-08-26 ENCOUNTER — OFFICE VISIT (OUTPATIENT)
Dept: PHYSICAL THERAPY | Facility: CLINIC | Age: 70
End: 2020-08-26
Payer: COMMERCIAL

## 2020-08-26 DIAGNOSIS — M25.532 LEFT WRIST PAIN: Primary | ICD-10-CM

## 2020-08-26 PROCEDURE — 97140 MANUAL THERAPY 1/> REGIONS: CPT

## 2020-08-26 PROCEDURE — 97112 NEUROMUSCULAR REEDUCATION: CPT

## 2020-08-26 PROCEDURE — 97110 THERAPEUTIC EXERCISES: CPT

## 2020-08-26 NOTE — PROGRESS NOTES
Daily Note     Today's date: 2020  Patient name: Rupa Peña  : 1950  MRN: [id number]  Referring provider: Alfonso Malcolm DO  Dx: No diagnosis found  Subjective: Pt reports continued improvement in her wrist        Objective: See treatment diary below  Pt noting tingling in her thumb and IF; PT issued a SA wrist support for nighttime   and when active during the day     AROM left wrist E/F- 60/55;                    Thumb MP- 0/60; IP- 0/75;opposition--2 0 cm  Strength-  II- 50/60; 3 ALISSON- ; Key-   Circumference at wrist- 15 5 cm; MPs- 19 0 cm; Thumb P1-5 6 cm; P1- 5 6 cm  Special tests- (+) FCR, (-) Tinel at wrist  Pt cont to note occasional tingling in her thumb and wrist area      Assessment: Tolerated treatment well today  Minimal complaints  Plan: Progress treatment as tolerated  Continue with current program      Precautions: avoid stressful or repetitive actiivity  Manuals          STM 15 15 15 15 15 15 15 15 15  15                           HEP  CC  isotoner  SA wrist support                                     [address]  wrist/thumb            LMed           Neuro Re-Ed                      HP/pulsed biph 15 15 15 15 15 15 15 15 15  15                                                                                                                                                   Ther Ex                       Theraputty   T/Y 3'  T/Y 3'  O 3'  O 3'  T/Y 3' T/Y 2' T/Y 2' T 2' T/Y 2'  T/Y 2'                 5 finger    T/Y  2/10  T/Y 2/10  O 2/10  O 2/10  T/Y 2/10 T/Y 2/10 T/Y 2/10 T 2/10 T/Y 2'  T/Y 2'   Farooq  25#  2/10  25# 2/10  25 2/10  25 2/10  20 2/10  20 2/10 unable   20# 2/10 20 2/10  25# 2/10   Blue  IV  2/10  lV 2/10  IV 2/10  IV 2/10  IV 2/10  IV 2/10  unable   III 2/10 III 2/10  III 2/10   Flexbar  R/Y  2/10  R/Y 2/10  R/Y 2/10  R/Y  R/Y 2/10  R/Y 2/10  R/Y  2/10 Y 2/10 Y 2/10  R/Y 2/10   DB E/F  4#  2/10  4# 2/10  4 2/10  4 2/10  3 2/10  3 2/10  3  2/10 3# 2/10 3# 2/10  3# 2/10   S/P  H  2/10  H 2/10  H 2/10  H 2/10  0# 2/10  0# 2/10  0  2/10  H  2/10 0# 2/10  0#  2/10    Twister  20/20  20/20  20/20  20/20  20/20  20/20  20/20  20/20  20/20  20/20   Comp   Y 2'  Y 2'  Y 2'  Y 2'            Y 2'                                                                                                                          Modalities                       US  12 12 12 dc dc        CP 15 15 15 15 15 15 15 15 15  15

## 2020-08-31 ENCOUNTER — OFFICE VISIT (OUTPATIENT)
Dept: PHYSICAL THERAPY | Facility: CLINIC | Age: 70
End: 2020-08-31
Payer: COMMERCIAL

## 2020-08-31 DIAGNOSIS — M25.532 LEFT WRIST PAIN: Primary | ICD-10-CM

## 2020-08-31 PROCEDURE — 97140 MANUAL THERAPY 1/> REGIONS: CPT | Performed by: PHYSICAL THERAPIST

## 2020-08-31 PROCEDURE — 97112 NEUROMUSCULAR REEDUCATION: CPT | Performed by: PHYSICAL THERAPIST

## 2020-08-31 PROCEDURE — 97110 THERAPEUTIC EXERCISES: CPT | Performed by: PHYSICAL THERAPIST

## 2020-08-31 NOTE — PROGRESS NOTES
Daily Note     Today's date: 2020  Patient name: Lashawn Enamorado  : 1950  MRN: [id number]  Referring provider: Zenon Gould DO  Dx:   Encounter Diagnosis     ICD-10-CM    1  Left wrist pain  M25 532                   Subjective: pt feels she is making progress with less pain in her wrist and more ability to use her hand for home activity      Objective: See treatment diary below      Assessment: Tolerated treatment well  Patient would benefit from continued PT      Plan: Continue per plan of care  Continue with current program      Precautions: avoid stressful or repetitive actiivity  Manuals         STM 15 15 15 15 15 15 15 15 15  15                           HEP  CC  isotoner  SA wrist support                                     [address]  wrist/thumb                       Neuro Re-Ed                      HP/pulsed biph 15 15 15 15 15 15 15 15 15  15                                                                                                                                                   Ther Ex                       Theraputty   T/Y 3'  T/Y 3'  O 3'  O 3' O  3' T/Y 2' T/Y 2' T 2' T/Y 2'  T/Y 2'                 5 finger    T/Y  2/10  T/Y 2/10  O 2/10  O 2/10  O  2/10 T/Y 2/10 T/Y 2/10 T 2/10 T/Y 2'  T/Y 2'   Farooq  25#  2/10  25# 2/10  25 2/10  25 2/10  25 2/10  20 2/10 unable   20# 2/10 20 2/10  25# 2/10   Blue  IV  2/10  lV 2/10  IV 2/10  IV 2/10  IV 2/10  IV 2/10  unable   III 2/10 III 2/10  III 2/10   Flexbar  R/Y  2/10  R/Y 2/10  R/Y 2/10  R/Y  R/Y 2/10  R/Y 2/10  R/Y  2/10 Y 2/10 Y 2/10  R/Y 2/10   DB E/F  4#  2/10  4# 2/10  4 2/10  4 2/10  4 2/10  3 2/10  3  2/10 3# 2/10 3# 2/10  3# 2/10   S/P  H  2/10  H 2/10  H 2/10  H 2/10  H 2/10  0# 2/10  0  2/10  H  2/10 0# 2/10  0#  2/10   hSay's     4 2/10                                                                                             Comp   Y 2' Y 2'  Y 2'  Y 2'   Y 2'  Y 2'                                   Modalities                       US  12 12 12 dc dc        CP 15 15 15 15 15 15 15 15 15  15

## 2020-09-02 ENCOUNTER — OFFICE VISIT (OUTPATIENT)
Dept: PHYSICAL THERAPY | Facility: CLINIC | Age: 70
End: 2020-09-02
Payer: COMMERCIAL

## 2020-09-02 DIAGNOSIS — M25.532 LEFT WRIST PAIN: Primary | ICD-10-CM

## 2020-09-02 PROCEDURE — 97140 MANUAL THERAPY 1/> REGIONS: CPT

## 2020-09-02 PROCEDURE — 97110 THERAPEUTIC EXERCISES: CPT

## 2020-09-02 PROCEDURE — 97112 NEUROMUSCULAR REEDUCATION: CPT

## 2020-09-02 NOTE — PROGRESS NOTES
Daily Note     Today's date: 2020  Patient name: Naty Henriquez  : 1950  MRN: [id number]  Referring provider: Lalo Marie DO  Dx:   Encounter Diagnosis     ICD-10-CM    1  Left wrist pain  M25 532                   Subjective: Pt reports improved symptoms with her hand  Pt not "cutting masks which may be reason it is better "      Objective: See treatment diary below  Pt noting tingling in her thumb and IF; PT issued a SA wrist support for nighttime   and when active during the day     AROM left wrist E/F- 60/55;                    Thumb MP- 0/60; IP- 0/75;opposition--2 0 cm  Strength-  II- 50/60; 3 ALISSON- ; Key-   Circumference at wrist- 15 5 cm; MPs- 19 0 cm; Thumb P1-5 6 cm; P1- 5 6 cm  Special tests- (+) FCR, (-) Tinel at wrist  Pt cont to note occasional tingling in her thumb and wrist area    Assessment: Tolerated treatment well today  Pt able to progress and modify exercises to alleviate discomfort  Pt with intermittent pain on dorsal/IF hand  Pt to see M/D Friday  Plan: Continue with current program          Precautions: avoid stressful or repetitive actiivity  Manuals        STM 15 15 15 15 15 15 15 15 15  15                           HEP  CC  isotoner  SA wrist support                                     [address]  wrist/thumb                       Neuro Re-Ed                      HP/pulsed biph 15 15 15 15 15 15 15 15 15  15                                                                                                                                                   Ther Ex                       Theraputty   T/Y 3'  T/Y 3'  O 3'  O 3' O  3' O 2' T/Y 2' T 2' T/Y 2'  T/Y 2'                 5 finger    T/Y  2/10  T/Y 2/10  O 2/10  O 2/10  O  2/10 O 2/10 T/Y 2/10 T 2/10 T/Y 2'  T/Y 2'   Farooq  25#  2/10  25# 2/10  25 2/10  25 2/10  25 2/10  25 2/10 unable   20# 2/10 20 2/10  25# 2/10   Blue  IV  2/10  lV 2/10  IV 2/10  IV 2/10  IV 2/10  V 2/10  unable   III 2/10 III 2/10  III 2/10   Flexbar  R/Y  2/10  R/Y 2/10  R/Y 2/10  R/Y  R/Y 2/10  R/Y 2/10  R/Y  2/10 Y 2/10 Y 2/10  R/Y 2/10   DB E/F  4#  2/10  4# 2/10  4 2/10  4 2/10  4 2/10  4 2/10  3  2/10 3# 2/10 3# 2/10  3# 2/10   S/P  H  2/10  H 2/10  H 2/10  H 2/10  H 2/10  0# 2/10  0  2/10  H  2/10 0# 2/10  0#  2/10   Geraldoman's     4 2/10                                                                                             Comp   Y 2' Y 2'  Y 2'  Y 2'   Y 2'  Y 3'                                   Modalities                       US  12 12 12 dc dc        CP 15 15 15 15 15 15 15 15 15  15

## 2020-09-03 ENCOUNTER — HOSPITAL ENCOUNTER (OUTPATIENT)
Dept: NON INVASIVE DIAGNOSTICS | Facility: HOSPITAL | Age: 70
Discharge: HOME | End: 2020-09-03
Payer: COMMERCIAL

## 2020-09-03 DIAGNOSIS — R06.02 SHORTNESS OF BREATH: ICD-10-CM

## 2020-09-03 PROCEDURE — 93306 TTE W/DOPPLER COMPLETE: CPT

## 2020-09-03 PROCEDURE — 93306 TTE W/DOPPLER COMPLETE: CPT | Performed by: INTERNAL MEDICINE

## 2020-09-08 ENCOUNTER — APPOINTMENT (OUTPATIENT)
Dept: PHYSICAL THERAPY | Facility: CLINIC | Age: 70
End: 2020-09-08
Payer: COMMERCIAL

## 2020-09-08 NOTE — PROGRESS NOTES
PT Re-Evaluation     Today's date: 8/10/2020  Patient name: Naty Henriquez  : 1950  MRN: [id number]  Referring provider: Lalo Marie DO  Dx:   Encounter Diagnosis     ICD-10-CM    1  Left wrist pain  M25 532        Start Time: 1330  Stop Time: 1445  Total time in clinic (min): 75 minutes    Assessment  Assessment details: Pt is a 70 YO female presenting to PT with pain, decreased AROM, strength and tolerance to activity  Pt would benefit from skilled intervention to address these issues and maximize overall function  Occupation- retired  Dominant- left; Involved- left  Pt notes less pain with improved motion  She has been caution to avoid repetitive or strong grasp with activity  Goals  ST  Decrease pain to 2-3/10 in 4 weeks            2  Decrease swelling in fingers and wrist            3  Increase AROM to composite fist and extension            4   Provide compression for support  LT  Increase functional motion and strength for independence with ADL and self care by DC            2   Ability to RT recreational activity by DC    Plan  Patient would benefit from: skilled physical therapy  Planned modality interventions: thermotherapy: hydrocollator packs, cryotherapy and ultrasound  Planned therapy interventions: manual therapy, neuromuscular re-education, orthotic fitting/training, strengthening, stretching, therapeutic activities, therapeutic exercise and home exercise program  Frequency: 3x week  Duration in weeks: 4  Treatment plan discussed with: patient        Subjective Evaluation    History of Present Illness  Mechanism of injury: Progressive worsening of symptoms from repetitive use while making COVID masks  Pain  Current pain ratin  At best pain ratin  At worst pain rating: 3  Location: left thumb IF, MF and volar wrist    Hand dominance: left    Treatments  Current treatment: physical therapy  Patient Goals  Patient goals for therapy: decreased edema, decreased pain, increased strength, independence with ADLs/IADLs, return to sport/leisure activities and increased motion          Objective     General Comments:      Wrist/Hand Comments  Pt noting tingling in her thumb and IF; PT issued a SA wrist support for nighttime   and when active during the day  AROM left wrist E/F- 60/55; Thumb MP- 0/60; IP- 0/75;opposition--2 0 cm  Strength-  II- 50/60; 3 ALISSON- 8/14; Key- 12/14  Circumference at wrist- 15 5 cm; MPs- 19 0 cm; Thumb P1-5 6 cm; P1- 5 6 cm  Special tests- (+) FCR, (-) Tinel at wrist  Pt cont to note occasional tingling in her thumb and wrist area                Precautions: avoid stressful or repetitive actiivity    Manuals 7/8 7/15 7/20 7/22 7/27 7/29 8/3 8/5 8/10    STM 15 15 15 15 15 15 15 15 15  15                           HEP  CC  isotoner  SA wrist support       KR                                   15   [address]  wrist/thumb            LMed           Neuro Re-Ed                       HP/pulsed biph 15 15 15 15 15 15 15 15 15  15                                                                                                                                                   Ther Ex                       Theraputty   T/Y 3'  T/Y 3'  T/Y 3'  T/Y 3'  T/Y 3' T/Y 2' T/Y 2' T 2' T/Y 2'  T/Y 2'                 5 finger    T/Y  2/10  T/Y 2/10  T/Y 2/10  T/Y 2/10  T/Y 2/10 T/Y 2/10 T/Y 2/10 T 2/10 T/Y 2'  T/Y 2'   Farooq  20  2/10  20 2/10  20 2/10  20 2/10  20 2/10  20 2/10 unable    20# 2/10 20 2/10  20 2/10   Blue  III  2/10  IV 2/10  IV 2/10  IV 2/10  IV 2/10  IV 2/10  unable    III 2/10 III 2/10  III 2/10   Flexbar  Y  2/10  R/Y 2/10  R/Y 2/10  R/Y  R/Y 2/10  R/Y 2/10  R/Y  2/10 Y 2/10 Y 2/10  Y 2/10   DB E/F  3  2/10  3 2/10  3 2/10  3 2/10  3 2/10  3 2/10  3  2/10 3# 2/10 3# 2/10  3 2/10   S/P  H  2/10  H 2/10  H 2/10  H 2/10  0# 2/10  0# 2/10  0  2/10  H  2/10 0# 2/10  --->    Twister    20/20  20/20  20/20  20/20  20/20  20/20  20/20  20/20                                                                                                                                                   Modalities                       US  12 12 12 12 12 12 12 12 12  12   CP 15 15 15 15 15 15 15 15 15  15

## 2020-09-09 ENCOUNTER — TRANSCRIBE ORDERS (OUTPATIENT)
Dept: PHYSICAL THERAPY | Facility: CLINIC | Age: 70
End: 2020-09-09

## 2020-09-09 ENCOUNTER — OFFICE VISIT (OUTPATIENT)
Dept: PHYSICAL THERAPY | Facility: CLINIC | Age: 70
End: 2020-09-09
Payer: COMMERCIAL

## 2020-09-09 DIAGNOSIS — M25.532 LEFT WRIST PAIN: Primary | ICD-10-CM

## 2020-09-09 PROCEDURE — 97112 NEUROMUSCULAR REEDUCATION: CPT

## 2020-09-09 PROCEDURE — 97110 THERAPEUTIC EXERCISES: CPT

## 2020-09-09 PROCEDURE — 97140 MANUAL THERAPY 1/> REGIONS: CPT

## 2020-09-09 NOTE — PROGRESS NOTES
Daily Note     Today's date: 2020  Patient name: Naty Henriquez  : 1950  MRN: [id number]  Referring provider: Lalo Marie DO  Dx:   Encounter Diagnosis     ICD-10-CM    1  Left wrist pain  M25 532                   Subjective: Pt  reports her tingling and pain are much better  Pt  feels she will be ready to be done therapy next week  Objective: See treatment diary below      Pt noting tingling in her thumb and IF; PT issued a SA wrist support for nighttime   and when active during the day     AROM left wrist E/F- 60/55;                    Thumb MP- 0/60; IP- 0/75;opposition--2 0 cm  Strength-  II- 50/60; 3 ALISSON- ; Key-   Circumference at wrist- 15 5 cm; MPs- 19 0 cm; Thumb P1-5 6 cm; P1- 5 6 cm  Special tests- (+) FCR, (-) Tinel at wrist  Pt cont to note occasional tingling in her thumb and wrist area      Assessment: Tolerated treatment well  Patient exhibited good technique with therapeutic exercises and would benefit from continued PT  Some discomfort noted dorsal wrist with STM  Plan: Progress treatment as tolerated  Continue with current program      Precautions: avoid stressful or repetitive actiivity  Manuals        STM 15 15 15 15 15 15 15 15 15  15                           HEP  CC  isotoner  SA wrist support                                     [address]  wrist/thumb                       Neuro Re-Ed                      HP/pulsed biph 15 15 15 15 15 15 15 15 15  15                                                                                                                                                   Ther Ex                       Theraputty   T/Y 3'  T/Y 3'  O 3'  O 3' O  3' O 3'' T/Y 2' T 2' T/Y 2'  T/Y 2'                 5 finger    T/Y  2/10  T/Y 2/10  O 2/10  O 2/10  O  2/10 O 2/10 T/Y 2/10 T 2/10 T/Y 2'  T/Y 2'   Farooq  25#  2/10  25# 2/10  25 2/10  25 2/10  25 2/10  25 2/10 unable   20# 2/10 20 2/10  25# 2/10   Blue  IV  2/10  lV 2/10  IV 2/10  IV 2/10  IV 2/10  V 2/10  unable   III 2/10 III 2/10  III 2/10   Flexbar  R/Y  2/10  R/Y 2/10  R/Y 2/10  R/Y  R/Y 2/10  R 2/10  R/Y  2/10 Y 2/10 Y 2/10  R/Y 2/10   DB E/F  4#  2/10  4# 2/10  4 2/10  4 2/10  4 2/10  4 2/10  3  2/10 3# 2/10 3# 2/10  3# 2/10   S/P  H  2/10  H 2/10  H 2/10  H 2/10  H 2/10  H 0# 2/10  0  2/10  H  2/10 0# 2/10  0#  2/10   Zottman's     4 2/10 4  2/10                                                                                            Comp   Y 2' Y 2'  Y 2'  Y 2'   Y 2'  Y 2'                                   Modalities                       US  12 12 12 dc dc DC       CP 15 15 15 15 15 15 15 15 15  15

## 2020-09-10 ENCOUNTER — APPOINTMENT (OUTPATIENT)
Dept: PHYSICAL THERAPY | Facility: CLINIC | Age: 70
End: 2020-09-10
Payer: COMMERCIAL

## 2020-09-14 ENCOUNTER — TRANSCRIBE ORDERS (OUTPATIENT)
Dept: PHYSICAL THERAPY | Facility: CLINIC | Age: 70
End: 2020-09-14

## 2020-09-14 ENCOUNTER — OFFICE VISIT (OUTPATIENT)
Dept: PHYSICAL THERAPY | Facility: CLINIC | Age: 70
End: 2020-09-14
Payer: COMMERCIAL

## 2020-09-14 DIAGNOSIS — M25.532 LEFT WRIST PAIN: Primary | ICD-10-CM

## 2020-09-14 PROCEDURE — 97110 THERAPEUTIC EXERCISES: CPT

## 2020-09-14 PROCEDURE — 97112 NEUROMUSCULAR REEDUCATION: CPT

## 2020-09-14 PROCEDURE — 97140 MANUAL THERAPY 1/> REGIONS: CPT

## 2020-09-14 NOTE — LETTER
2020    [id number]    Patient: Adamaris Elkins   YOB: 1950   Date of Visit: 2020     Encounter Diagnosis     ICD-10-CM    1  Left wrist pain  M25 532        Dear Dr Sagar San: Thank you for your recent referral of Adamaris Elkins  Please review the attached evaluation summary from Serenity's recent visit  Please verify that you agree with the plan of care by signing the attached order  If you have any questions or concerns, please do not hesitate to call  I sincerely appreciate the opportunity to share in the care of one of your patients and hope to have another opportunity to work with you in the near future  Sincerely,    Carlos Perry, SALLYT, CHT    Referring Provider:      I certify that I have read the below Plan of Care and certify the need for these services furnished under this plan of treatment while under my care  [id number]  VIA Facsimile: [phone]          Daily Note     Today's date: 2020  Patient name: Adamaris Elkins  : 1950  MRN: [id number]  Referring provider: Lata Mckeon DO  Dx: No diagnosis found  Subjective: Pt states that her thumb has started to bother her "  Also some discomfort on dorsal wrist        Objective: See treatment diary below     Pt noting tingling in her thumb and IF; PT issued a SA wrist support for nighttime   and when active during the day     AROM left wrist E/F- 60/55;                    Thumb MP- 0/60; IP- 0/75;opposition--2 0 cm  Strength-  II- 50/60; 3 ALISSON- /; Key-   Circumference at wrist- 15 5 cm; MPs- 19 0 cm; Thumb P1-5 6 cm; P1- 5 6 cm  Special tests- (+) FCR, (-) Tinel at wrist  Pt cont to note occasional tingling in her thumb and wrist area      Assessment: Tolerated treatment fair today   Pt with no complaints during session although discomfort entering clinic in thumb and dorsal wrist      Plan: Progress treatment as tolerated  Continue with current program      Precautions: avoid stressful or repetitive actiivity  Manuals       STM 15 15 15 15 15 15 15 15 15  15                           HEP  CC  isotoner  SA wrist support                                     [address]  wrist/thumb                       Neuro Re-Ed                      HP/pulsed biph 15 15 15 15 15 15 15 15 15  15                                                                                                                                                   Ther Ex                       Theraputty   T/Y 3'  T/Y 3'  O 3'  O 3' O  3' O 3'' O 3'' T 2' T/Y 2'  T/Y 2'                 5 finger   T/Y  2/10  T/Y 2/10  O 2/10  O 2/10  O  2/10 O 2/10 O 2/10 T 2/10 T/Y 2'  T/Y 2'   Farooq  25#  2/10  25# 2/10  25 2/10  25 2/10  25 2/10  25 2/10 25 2/10   20# 2/10 20 2/10  25# 2/10   Blue  IV  2/10  lV 2/10  IV 2/10  IV 2/10  IV 2/10  V 2/10  V 210   III 2/10 III 2/10  III 2/10   Flexbar  R/Y  2/10  R/Y 2/10  R/Y 2/10  R/Y  R/Y 2/10  R 2/10  R  2/10 Y 2/10 Y 2/10  R/Y 2/10   DB E/F  4#  2/10  4# 2/10  4 2/10  4 2/10  4 2/10  4 2/10  4  2/10 3# 2/10 3# 2/10  3# 2/10   S/P  H  2/10  H 2/10  H 2/10  H 2/10  H 2/10  H 0# 2/10  0  2/10  H  2/10 0# 2/10  0#  2/10   Zottman's     4 2/10 4  2/10 4# 2/10                                                                                           Comp   Y 2' Y 2'  Y 2'  Y 2'   Y 2'  Y 2'  Y 2'                                 Modalities                       US  12 12 12 dc dc DC       CP 15 15 15 15 15 15 15 15 15  15                                                   PT Re-Evaluation     Today's date: 8/10/2020  Patient name: Melody Andrade  : 1950  MRN: [id number]  Referring provider: Jose Story DO  Dx:   Encounter Diagnosis     ICD-10-CM    1   Left wrist pain  M25 532        Start Time: 1300  Stop Time: 1400  Total time in clinic (min): 60 minutes    Assessment  Assessment details: Pt is a 72 YO female presenting to PT with pain, decreased AROM, strength and tolerance to activity  Pt would benefit from skilled intervention to address these issues and maximize overall function  Occupation- retired  Dominant- left; Involved- left  Pt notes less pain with improved motion  She has been caution to avoid repetitive or strong grasp with activity  Goals  ST  Decrease pain to 2-3/10 in 4 weeks            2  Decrease swelling in fingers and wrist            3  Increase AROM to composite fist and extension            4   Provide compression for support  LT  Increase functional motion and strength for independence with ADL and self care by DC            2   Ability to RT recreational activity by DC    Plan  Patient would benefit from: skilled physical therapy  Planned modality interventions: thermotherapy: hydrocollator packs, cryotherapy and ultrasound  Planned therapy interventions: manual therapy, neuromuscular re-education, orthotic fitting/training, strengthening, stretching, therapeutic activities, therapeutic exercise and home exercise program  Frequency: 3x week  Duration in weeks: 4  Treatment plan discussed with: patient        Subjective Evaluation    History of Present Illness  Mechanism of injury: Progressive worsening of symptoms from repetitive use while making COVID masks  Pain  Current pain ratin  At best pain ratin  At worst pain ratin  Location: left thumb IF, MF and volar wrist    Hand dominance: left    Treatments  Current treatment: physical therapy  Patient Goals  Patient goals for therapy: decreased edema, decreased pain, increased strength, independence with ADLs/IADLs, return to sport/leisure activities and increased motion          Objective     General Comments:      Wrist/Hand Comments  Pt noting tingling in her thumb and IF; PT issued a SA wrist support for nighttime   and when active during the day     AROM left wrist E/F- 60/55;                    Thumb MP- 0/60; IP- 0/75;opposition--2 0 cm  Strength-  II- 50/60; 3 ALISSON- 8/14; Key- 12/14  Circumference at wrist- 15 5 cm; MPs- 19 0 cm; Thumb P1-5 6 cm; P1- 5 6 cm  Special tests- (+) FCR, (-) Tinel at wrist  Pt cont to note occasional tingling in her thumb and wrist area                Precautions: avoid stressful or repetitive actiivity    Manuals 8/17 8/19 8/24 8/26 8/31 9/9 9/14         STM 15 15 15 15 15 15 15 15 15  15                           HEP  CC  isotoner  SA wrist support                                               [address]  wrist/thumb                       Neuro Re-Ed                       HP/pulsed biph 15 15 15 15 15 15 15 15 15  15                                                                                                                                                   Ther Ex                       Theraputty   T/Y 3'  T/Y 3'  O 3'  O 3' O  3' O 3'' O 3'' T 2' T/Y 2'  T/Y 2'                 5 finger    T/Y  2/10  T/Y 2/10  O 2/10  O 2/10  O  2/10 O 2/10 O 2/10 T 2/10 T/Y 2'  T/Y 2'   Farooq  25#  2/10  25# 2/10  25 2/10  25 2/10  25 2/10  25 2/10 25 2/10    20# 2/10 20 2/10  25# 2/10   Blue  IV  2/10  lV 2/10  IV 2/10  IV 2/10  IV 2/10  V 2/10  V 210    III 2/10 III 2/10  III 2/10   Flexbar  R/Y  2/10  R/Y 2/10  R/Y 2/10  R/Y  R/Y 2/10  R 2/10  R  2/10 Y 2/10 Y 2/10  R/Y 2/10   DB E/F  4#  2/10  4# 2/10  4 2/10  4 2/10  4 2/10  4 2/10  4  2/10 3# 2/10 3# 2/10  3# 2/10   S/P  H  2/10  H 2/10  H 2/10  H 2/10  H 2/10  H 0# 2/10  0  2/10  H  2/10 0# 2/10  0#  2/10   Zottman's         4 2/10 4  2/10 4# 2/10                                                                                                          Comp   Y 2' Y 2'  Y 2'  Y 2'   Y 2'  Y 2'  Y 2'                                 Modalities                       US  12 12 12 dc dc DC           CP 15 15 15 15 15 15 15 15 15  15

## 2020-09-14 NOTE — PROGRESS NOTES
PT Re-Evaluation     Today's date: 8/10/2020  Patient name: Malena Au  : 1950  MRN: [id number]  Referring provider: Martha Gunter DO  Dx:   Encounter Diagnosis     ICD-10-CM    1  Left wrist pain  M25 532        Start Time: 1300  Stop Time: 1400  Total time in clinic (min): 60 minutes    Assessment  Assessment details: Pt is a 72 YO female presenting to PT with pain, decreased AROM, strength and tolerance to activity  Pt would benefit from skilled intervention to address these issues and maximize overall function  Occupation- retired  Dominant- left; Involved- left  Pt notes less pain with improved motion  She has been caution to avoid repetitive or strong grasp with activity  Goals  ST  Decrease pain to 2-3/10 in 4 weeks            2  Decrease swelling in fingers and wrist            3  Increase AROM to composite fist and extension            4   Provide compression for support  LT  Increase functional motion and strength for independence with ADL and self care by DC            2   Ability to RT recreational activity by DC    Plan  Patient would benefit from: skilled physical therapy  Planned modality interventions: thermotherapy: hydrocollator packs, cryotherapy and ultrasound  Planned therapy interventions: manual therapy, neuromuscular re-education, orthotic fitting/training, strengthening, stretching, therapeutic activities, therapeutic exercise and home exercise program  Frequency: 3x week  Duration in weeks: 4  Treatment plan discussed with: patient        Subjective Evaluation    History of Present Illness  Mechanism of injury: Progressive worsening of symptoms from repetitive use while making COVID masks  Pain  Current pain ratin  At best pain ratin  At worst pain ratin  Location: left thumb IF, MF and volar wrist    Hand dominance: left    Treatments  Current treatment: physical therapy  Patient Goals  Patient goals for therapy: decreased edema, decreased pain, increased strength, independence with ADLs/IADLs, return to sport/leisure activities and increased motion          Objective     General Comments:      Wrist/Hand Comments  Pt noting tingling in her thumb and IF; PT issued a SA wrist support for nighttime   and when active during the day     AROM left wrist E/F- 60/55;                    Thumb MP- 0/60; IP- 0/75;opposition--2 0 cm  Strength-  II- 50/60; 3 ALISSON- 8/14; Key- 12/14  Circumference at wrist- 15 5 cm; MPs- 19 0 cm; Thumb P1-5 6 cm; P1- 5 6 cm  Special tests- (+) FCR, (-) Tinel at wrist  Pt cont to note occasional tingling in her thumb and wrist area                Precautions: avoid stressful or repetitive actiivity    Manuals 8/17 8/19 8/24 8/26 8/31 9/9 9/14         STM 15 15 15 15 15 15 15 15 15  15                           HEP  CC  isotoner  SA wrist support                                               [address]  wrist/thumb                       Neuro Re-Ed                       HP/pulsed biph 15 15 15 15 15 15 15 15 15  15                                                                                                                                                   Ther Ex                       Theraputty   T/Y 3'  T/Y 3'  O 3'  O 3' O  3' O 3'' O 3'' T 2' T/Y 2'  T/Y 2'                 5 finger    T/Y  2/10  T/Y 2/10  O 2/10  O 2/10  O  2/10 O 2/10 O 2/10 T 2/10 T/Y 2'  T/Y 2'   Farooq  25#  2/10  25# 2/10  25 2/10  25 2/10  25 2/10  25 2/10 25 2/10    20# 2/10 20 2/10  25# 2/10   Blue  IV  2/10  lV 2/10  IV 2/10  IV 2/10  IV 2/10  V 2/10  V 210    III 2/10 III 2/10  III 2/10   Flexbar  R/Y  2/10  R/Y 2/10  R/Y 2/10  R/Y  R/Y 2/10  R 2/10  R  2/10 Y 2/10 Y 2/10  R/Y 2/10   DB E/F  4#  2/10  4# 2/10  4 2/10  4 2/10  4 2/10  4 2/10  4  2/10 3# 2/10 3# 2/10  3# 2/10   S/P  H  2/10  H 2/10  H 2/10  H 2/10  H 2/10  H 0# 2/10  0  2/10  H  2/10 0# 2/10  0#  2/10   Shay's         4 2/10 4  2/10 4# 2/10                                                                                                          Comp   Y 2' Y 2'  Y 2'  Y 2'   Y 2'  Y 2'  Y 2'                                 Modalities                       US  12 12 12 dc dc DC           CP 15 15 15 15 15 15 15 15 15  15

## 2020-09-14 NOTE — PROGRESS NOTES
Daily Note     Today's date: 2020  Patient name: Mayda Arboleda  : 1950  MRN: [id number]  Referring provider: Noé Hewitt DO  Dx: No diagnosis found  Subjective: Pt states that her thumb has started to bother her "  Also some discomfort on dorsal wrist        Objective: See treatment diary below     Pt noting tingling in her thumb and IF; PT issued a SA wrist support for nighttime   and when active during the day     AROM left wrist E/F- 60/55;                    Thumb MP- 0/60; IP- 0/75;opposition--2 0 cm  Strength-  II- 50/60; 3 ALISSON- ; Key-   Circumference at wrist- 15 5 cm; MPs- 19 0 cm; Thumb P1-5 6 cm; P1- 5 6 cm  Special tests- (+) FCR, (-) Tinel at wrist  Pt cont to note occasional tingling in her thumb and wrist area      Assessment: Tolerated treatment fair today  Pt with no complaints during session although discomfort entering clinic in thumb and dorsal wrist      Plan: Progress treatment as tolerated  Continue with current program      Precautions: avoid stressful or repetitive actiivity  Manuals       STM 15 15 15 15 15 15 15 15 15  15                           HEP  CC  isotoner  SA wrist support                                     [address]  wrist/thumb                       Neuro Re-Ed                      HP/pulsed biph 15 15 15 15 15 15 15 15 15  15                                                                                                                                                   Ther Ex                       Theraputty   T/Y 3'  T/Y 3'  O 3'  O 3' O  3' O 3'' O 3'' T 2' T/Y 2'  T/Y 2'                 5 finger    T/Y  2/10  T/Y 2/10  O 2/10  O 2/10  O  2/10 O 2/10 O 2/10 T 2/10 T/Y 2'  T/Y 2'   Farooq  25#  2/10  25# 2/10  25 2/10  25 2/10  25 2/10  25 2/10 25 2/10   20# 2/10 20 2/10  25# 2/10   Blue  IV  2/10  lV 2/10  IV 2/10  IV 2/10  IV 2/10  V 2/10  V 210   III 2/10 III 2/10  III 2/10   Flexbar  R/Y  2/10  R/Y 2/10  R/Y 2/10  R/Y  R/Y 2/10  R 2/10  R  2/10 Y 2/10 Y 2/10  R/Y 2/10   DB E/F  4#  2/10  4# 2/10  4 2/10  4 2/10  4 2/10  4 2/10  4  2/10 3# 2/10 3# 2/10  3# 2/10   S/P  H  2/10  H 2/10  H 2/10  H 2/10  H 2/10  H 0# 2/10  0  2/10  H  2/10 0# 2/10  0#  2/10   Zottman's     4 2/10 4  2/10 4# 2/10                                                                                           Comp   Y 2' Y 2'  Y 2'  Y 2'   Y 2'  Y 2'  Y 2'                                 Modalities                       US  12 12 12 dc dc DC       CP 15 15 15 15 15 15 15 15 15  15

## 2020-09-16 ENCOUNTER — OFFICE VISIT (OUTPATIENT)
Dept: PHYSICAL THERAPY | Facility: CLINIC | Age: 70
End: 2020-09-16
Payer: COMMERCIAL

## 2020-09-16 DIAGNOSIS — M25.532 LEFT WRIST PAIN: Primary | ICD-10-CM

## 2020-09-16 PROCEDURE — 97110 THERAPEUTIC EXERCISES: CPT

## 2020-09-16 PROCEDURE — 97140 MANUAL THERAPY 1/> REGIONS: CPT

## 2020-09-16 PROCEDURE — 97112 NEUROMUSCULAR REEDUCATION: CPT

## 2020-09-16 NOTE — PROGRESS NOTES
Daily Note     Today's date: 2020  Patient name: Lisa Latif  : 1950  MRN: [id number]  Referring provider: Rc Whittington DO  Dx:   Encounter Diagnosis     ICD-10-CM    1  Left wrist pain  M25 532                   Subjective: Pt  reports her wrist/thumb is feeling good  Objective: See treatment diary below      General Comments:      Wrist/Hand Comments  Pt noting tingling in her thumb and IF; PT issued a SA wrist support for nighttime   and when active during the day     AROM left wrist E/F- 60/55;                    Thumb MP- 0/60; IP- 0/75;opposition--2 0 cm  Strength-  II- 50/60; 3 ALISSON- ; Key-   Circumference at wrist- 15 5 cm; MPs- 19 0 cm; Thumb P1-5 6 cm; P1- 5 6 cm  Special tests- (+) FCR, (-) Tinel at wrist  Pt cont to note occasional tingling in her thumb and wrist area       Assessment: Tolerated treatment well  Patient exhibited good technique with therapeutic exercises and would benefit from continued PT  Pt  with some c/o tenderness dorsal wrist with STM  Plan: Progress treatment as tolerated  Precautions: avoid stressful or repetitive actiivity    Manuals        STM 15 15 15 15 15 15 15 15 15  15                           HEP  CC  isotoner  SA wrist support                                                CMC wrist/thumb                       Neuro Re-Ed                       HP/pulsed biph 15 15 15 15 15 15 15 15 15  15                                                                                                                                                   Ther Ex                       Theraputty   T/Y 3'  T/Y 3'  O 3'  O 3' O  3' O 3'' O 3'' O 2' T/Y 2'  T/Y 2'                 5 finger    T/Y  2/10  T/Y 2/10  O 2/10  O 2/10  O  2/10 O 2/10 O 2/10 O 2/10 T/Y 2'  T/Y 2'   Farooq  25#  2/10  25# 2/10  25 2/10  25 2/10  25 2/10  25 2/10 25 2/10    25# 2/10 20 2/10  25# 2/10   Blue  IV  2/10  lV 2/10  IV 2/10  IV 2/10  IV 2/10  V 2/10  V 210    V 2/10 III 2/10  III 2/10   Flexbar  R/Y  2/10  R/Y 2/10  R/Y 2/10  R/Y  R/Y 2/10  R 2/10  R  2/10 R 2/10 Y 2/10  R/Y 2/10   DB E/F  4#  2/10  4# 2/10  4 2/10  4 2/10  4 2/10  4 2/10  4  2/10 4# 2/10 3# 2/10  3# 2/10   S/P  H  2/10  H 2/10  H 2/10  H 2/10  H 2/10  H 0# 2/10  0  2/10  H  2/10 0# 2/10  0#  2/10   Zottman's         4 2/10 4  2/10 4# 2/10  4  2/10                                                                                                        Comp   Y 2' Y 2'  Y 2'  Y 2'   Y 2'  Y 2'  Y 2'  Y 3'                               Modalities                       US  12 12 12 dc dc DC           CP 15 15 15 15 15 15 15 15 15  15

## 2020-09-21 ENCOUNTER — OFFICE VISIT (OUTPATIENT)
Dept: PHYSICAL THERAPY | Facility: CLINIC | Age: 70
End: 2020-09-21
Payer: COMMERCIAL

## 2020-09-21 ENCOUNTER — APPOINTMENT (OUTPATIENT)
Dept: PHYSICAL THERAPY | Facility: CLINIC | Age: 70
End: 2020-09-21
Payer: COMMERCIAL

## 2020-09-21 DIAGNOSIS — M25.532 LEFT WRIST PAIN: Primary | ICD-10-CM

## 2020-09-21 PROCEDURE — 97110 THERAPEUTIC EXERCISES: CPT

## 2020-09-21 PROCEDURE — 97112 NEUROMUSCULAR REEDUCATION: CPT

## 2020-09-21 PROCEDURE — 97140 MANUAL THERAPY 1/> REGIONS: CPT

## 2020-09-21 NOTE — PROGRESS NOTES
Daily Note     Today's date: 2020  Patient name: Holly Fierro  : 1950  MRN: [id number]  Referring provider: Maddie Roberson DO  Dx:   Encounter Diagnosis     ICD-10-CM    1  Left wrist pain  M25 532                   Subjective: Pt  reports she pressure washed yesterday without her brace and she does not have pain today  Objective: See treatment diary below        General Comments:      Wrist/Hand Comments  Pt noting tingling in her thumb and IF; PT issued a SA wrist support for nighttime   and when active during the day     AROM left wrist E/F- 60/55;                    Thumb MP- 0/60; IP- 0/75;opposition--2 0 cm  Strength-  II- 50/60; 3 ALISSON- ; Key-   Circumference at wrist- 15 5 cm; MPs- 19 0 cm; Thumb P1-5 6 cm; P1- 5 6 cm  Special tests- (+) FCR, (-) Tinel at wrist  Pt cont to note occasional tingling in her thumb and wrist area       Assessment: Tolerated treatment well  Patient exhibited good technique with therapeutic exercises and would benefit from continued PT  Some "burning" noted with R flexbar rotation as well as tenderness dorsal wrist with STM  Plan: Progress treatment as tolerated  Precautions: avoid stressful or repetitive actiivity    Manuals      STM 15 15 15 15 15 15 15 15 15  15                           HEP  CC  isotoner  SA wrist support                                                CMC wrist/thumb                       Neuro Re-Ed                       HP/pulsed biph 15 15 15 15 15 15 15 15 15  15                                                                                                                                                   Ther Ex                       Theraputty   T/Y 3'  T/Y 3'  O 3'  O 3' O  3' O 3'' O 3'' O 2' O 2'  T/Y 2'                 5 finger    T/Y  2/10  T/Y 2/10  O 2/10  O 2/10  O  2/10 O 2/10 O 2/10 O 2/10 O 2'  T/Y 2'   Farooq  25#  2/10  25# 2/10  25 2/10  25 2/10  25 2/10  25 2/10 25 2/10    25# 2/10 25 2/10  25# 2/10   Blue  IV  2/10  lV 2/10  IV 2/10  IV 2/10  IV 2/10  V 2/10  V 210    V 2/10 V 2/10  III 2/10   Flexbar  R/Y  2/10  R/Y 2/10  R/Y 2/10  R/Y  R/Y 2/10  R 2/10  R  2/10 R 2/10 R 2/10  R/Y 2/10   DB E/F  4#  2/10  4# 2/10  4 2/10  4 2/10  4 2/10  4 2/10  4  2/10 4# 2/10 4# 2/10  3# 2/10   S/P  H  2/10  H 2/10  H 2/10  H 2/10  H 2/10  H 0# 2/10  0  2/10  H  2/10 H# 2/10  0#  2/10   Zottman's         4 2/10 4  2/10 4# 2/10  4  2/10  4  2/10                                                                                                      Comp   Y 2' Y 2'  Y 2'  Y 2'   Y 2'  Y 2'  Y 2'  Y 3'  Y 3'                             Modalities                       US  12 12 12 dc dc DC           CP 15 15 15 15 15 15 15 15 15  15

## 2020-09-23 ENCOUNTER — APPOINTMENT (OUTPATIENT)
Dept: PHYSICAL THERAPY | Facility: CLINIC | Age: 70
End: 2020-09-23
Payer: COMMERCIAL

## 2020-09-23 ENCOUNTER — OFFICE VISIT (OUTPATIENT)
Dept: PHYSICAL THERAPY | Facility: CLINIC | Age: 70
End: 2020-09-23
Payer: COMMERCIAL

## 2020-09-23 DIAGNOSIS — M25.532 LEFT WRIST PAIN: Primary | ICD-10-CM

## 2020-09-23 PROCEDURE — 97110 THERAPEUTIC EXERCISES: CPT

## 2020-09-23 PROCEDURE — 97140 MANUAL THERAPY 1/> REGIONS: CPT

## 2020-09-23 PROCEDURE — 97112 NEUROMUSCULAR REEDUCATION: CPT

## 2020-09-23 NOTE — PROGRESS NOTES
Daily Note     Today's date: 2020  Patient name: Con Pinto  : 1950  MRN: [id number]  Referring provider: Kt Harrison DO  Dx:   Encounter Diagnosis     ICD-10-CM    1  Left wrist pain  M25 532                   Subjective: Pt reports she is feeling better again after having increased discomfort  Objective: See treatment diary below    Wrist/Hand Comments  Pt noting tingling in her thumb and IF; PT issued a SA wrist support for nighttime   and when active during the day     AROM left wrist E/F- 60/55;                    Thumb MP- 0/60; IP- 0/75;opposition--2 0 cm  Strength-  II- 50/60; 3 ALISSON- ; Key-   Circumference at wrist- 15 5 cm; MPs- 19 0 cm; Thumb P1-5 6 cm; P1- 5 6 cm  Special tests- (+) FCR, (-) Tinel at wrist  Pt cont to note occasional tingling in her thumb and wrist area    Assessment: Tolerated treatment well today  Pt continues to work through a pain free session  Plan: Progress treatment as tolerated  Precautions: avoid stressful or repetitive actiivity    Manuals    STM 15 15 15 15 15 15 15 15 15  15                           HEP  CC  isotoner  SA wrist support                                                CMC wrist/thumb                       Neuro Re-Ed                       HP/pulsed biph 15 15 15 15 15 15 15 15 15  15                                                                                                                                                   Ther Ex                       Theraputty   T/Y 3'  T/Y 3'  O 3'  O 3' O  3' O 3'' O 3'' O 2' O 2' O 2'                 5 finger    T/Y  2/10  T/Y 2/10  O 2/10  O 2/10  O  2/10 O 2/10 O 2/10 O 2/10 O 2' O 2'   Farooq  25#  2/10  25# 2/10  25 2/10  25 2/10  25 2/10  25 2/10 25 2/10    25# 2/10 25 2/10  25# 2/10   Blue  IV  2/10  lV 2/10  IV 2/10  IV 2/10  IV 2/10  V 2/10  V 210    V 2/10 V 2/10  V 2/10   Flexbar  R/Y  2/10  R/Y 2/10  R/Y 2/10  R/Y  R/Y 2/10  R 2/10  R  2/10 R 2/10 R 2/10  R 2/10   DB E/F  4#  2/10  4# 2/10  4 2/10  4 2/10  4 2/10  4 2/10  4  2/10 4# 2/10 4# 2/10  4# 2/10   S/P  H  2/10  H 2/10  H 2/10  H 2/10  H 2/10  H 0# 2/10  0  2/10  H  2/10 H# 2/10  0#  2/10   Zottman's         4 2/10 4  2/10 4# 2/10  4  2/10  4  2/10  4 2/10                                                                                                    Comp   Y 2' Y 2'  Y 2'  Y 2'   Y 2'  Y 2'  Y 2'  Y 3'  Y 3'                             Modalities                       US  12 12 12 dc dc DC           CP 15 15 15 15 15 15 15 15 15  15

## 2020-09-28 ENCOUNTER — OFFICE VISIT (OUTPATIENT)
Dept: PHYSICAL THERAPY | Facility: CLINIC | Age: 70
End: 2020-09-28
Payer: COMMERCIAL

## 2020-09-28 ENCOUNTER — APPOINTMENT (OUTPATIENT)
Dept: PHYSICAL THERAPY | Facility: CLINIC | Age: 70
End: 2020-09-28
Payer: COMMERCIAL

## 2020-09-28 DIAGNOSIS — M25.532 LEFT WRIST PAIN: Primary | ICD-10-CM

## 2020-09-28 PROCEDURE — 97530 THERAPEUTIC ACTIVITIES: CPT | Performed by: PHYSICAL THERAPIST

## 2020-09-28 PROCEDURE — 97140 MANUAL THERAPY 1/> REGIONS: CPT | Performed by: PHYSICAL THERAPIST

## 2020-09-28 PROCEDURE — 97110 THERAPEUTIC EXERCISES: CPT | Performed by: PHYSICAL THERAPIST

## 2020-09-28 PROCEDURE — 97112 NEUROMUSCULAR REEDUCATION: CPT | Performed by: PHYSICAL THERAPIST

## 2020-09-28 NOTE — PROGRESS NOTES
PT Discharge    Today's date: 2020  Patient name: Serafin Lindsay  : 1950  MRN: [id number]  Referring provider: Shaniqua Byers DO  Dx:   Encounter Diagnosis     ICD-10-CM    1  Left wrist pain  M25 532                   Assessment  Assessment details: Pt is a 70 YO female presenting to PT with pain, decreased AROM, strength and tolerance to activity  Pt would benefit from skilled intervention to address these issues and maximize overall function  Occupation- retired  Dominant- left; Involved- left  Pt notes less pain with improved motion  She has been caution to avoid repetitive or strong grasp with activity  Pt has tolerated home activity and will transition to a HEP    Goals  ST  Decrease pain to 2-3/10 in 4 weeks            2  Decrease swelling in fingers and wrist            3  Increase AROM to composite fist and extension            4   Provide compression for support  LT  Increase functional motion and strength for independence with ADL and self care by DC            2   Ability to RT recreational activity by DC  Goals met    Plan  Patient would benefit from: skilled physical therapy  Planned modality interventions: thermotherapy: hydrocollator packs, cryotherapy and ultrasound  Planned therapy interventions: manual therapy, neuromuscular re-education, orthotic fitting/training, strengthening, stretching, therapeutic activities, therapeutic exercise and home exercise program  Frequency: 3x week  Duration in weeks: 4  Treatment plan discussed with: patient        Subjective Evaluation    History of Present Illness  Mechanism of injury: Progressive worsening of symptoms from repetitive use while making COVID masks  Pain  Current pain ratin  At best pain ratin  At worst pain ratin  Location: left thumb IF, MF and volar wrist    Hand dominance: left    Treatments  Current treatment: physical therapy  Patient Goals  Patient goals for therapy: decreased edema, decreased pain, increased strength, independence with ADLs/IADLs, return to sport/leisure activities and increased motion          Objective     General Comments:      Wrist/Hand Comments  Pt noting tingling in her thumb and IF; PT issued a SA wrist support for nighttime   and when active during the day     AROM left wrist E/F- 60/55;                    Thumb MP- 0/60; IP- 0/75;opposition--2 0 cm  Strength-  II- 50/60; 3 ALISSON- 8/14; Key- 12/14  Circumference at wrist- 15 5 cm; MPs- 19 0 cm; Thumb P1-5 6 cm; P1- 5 6 cm  Special tests- (+) FCR, (-) Tinel at wrist  Pt cont to note occasional tingling in her thumb and wrist area             Precautions: avoid stressful or repetitive actiivity    Manuals 9/28 9/23   STM 15 15 15 15 15 15 15 15 15  15                           HEP  CC  isotoner  SA wrist support                                               [address]  wrist/thumb                       Neuro Re-Ed                       HP/pulsed biph 15 15 15 15 15 15 15 15 15  15                                                                                                                                                   Ther Ex                       Theraputty  O  3'  T/Y 3'  O 3'  O 3' O  3' O 3'' O 3'' O 2' O 2' O 2'                 5 finger   O  2/10  T/Y 2/10  O 2/10  O 2/10  O  2/10 O 2/10 O 2/10 O 2/10 O 2' O 2'   Farooq  25#  2/10  25# 2/10  25 2/10  25 2/10  25 2/10  25 2/10 25 2/10    25# 2/10 25 2/10  25# 2/10   Blue  V  2/10  lV 2/10  IV 2/10  IV 2/10  IV 2/10  V 2/10  V 210    V 2/10 V 2/10  V 2/10   Flexbar  R  2/10  R/Y 2/10  R/Y 2/10  R/Y  R/Y 2/10  R 2/10  R  2/10 R 2/10 R 2/10  R 2/10   DB E/F  4#  2/10  4# 2/10  4 2/10  4 2/10  4 2/10  4 2/10  4  2/10 4# 2/10 4# 2/10  4# 2/10   S/P  H  2/10  H 2/10  H 2/10  H 2/10  H 2/10  H 0# 2/10  0  2/10  H  2/10 H# 2/10  0#  2/10   Shay's  4 2/10       4 2/10 4  2/10 4# 2/10  4  2/10  4  2/10  4 2/10                                                                                                    Comp   Y 3' Y 2'  Y 2'  Y 2'   Y 2'  Y 2'  Y 2'  Y 3'  Y 3'                             Modalities                       US  12 12 12 dc dc DC           CP 15 15 15 15 15 15 15 15 15  15

## 2020-09-30 ENCOUNTER — APPOINTMENT (OUTPATIENT)
Dept: PHYSICAL THERAPY | Facility: CLINIC | Age: 70
End: 2020-09-30
Payer: COMMERCIAL

## 2021-01-20 ENCOUNTER — IMMUNIZATIONS (OUTPATIENT)
Dept: FAMILY MEDICINE CLINIC | Facility: HOSPITAL | Age: 71
End: 2021-01-20

## 2021-01-20 DIAGNOSIS — Z23 ENCOUNTER FOR IMMUNIZATION: Primary | ICD-10-CM

## 2021-01-20 PROCEDURE — 0011A SARS-COV-2 / COVID-19 MRNA VACCINE (MODERNA) 100 MCG: CPT

## 2021-01-20 PROCEDURE — 91301 SARS-COV-2 / COVID-19 MRNA VACCINE (MODERNA) 100 MCG: CPT

## 2021-02-17 ENCOUNTER — IMMUNIZATIONS (OUTPATIENT)
Dept: FAMILY MEDICINE CLINIC | Facility: HOSPITAL | Age: 71
End: 2021-02-17

## 2021-02-17 DIAGNOSIS — Z23 ENCOUNTER FOR IMMUNIZATION: Primary | ICD-10-CM

## 2021-02-17 PROCEDURE — 0012A SARS-COV-2 / COVID-19 MRNA VACCINE (MODERNA) 100 MCG: CPT

## 2021-02-17 PROCEDURE — 91301 SARS-COV-2 / COVID-19 MRNA VACCINE (MODERNA) 100 MCG: CPT

## 2021-09-03 ENCOUNTER — HOSPITAL ENCOUNTER (OUTPATIENT)
Dept: MRI IMAGING | Facility: HOSPITAL | Age: 71
Discharge: HOME | End: 2021-09-03
Payer: COMMERCIAL

## 2021-09-03 DIAGNOSIS — M96.1 POSTLAMINECTOMY SYNDROME, NOT ELSEWHERE CLASSIFIED: ICD-10-CM
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
## 2021-09-03 DIAGNOSIS — R20.0 ANESTHESIA OF SKIN: ICD-10-CM
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
## 2021-09-03 DIAGNOSIS — M54.17 RADICULOPATHY, LUMBOSACRAL REGION: ICD-10-CM

## 2021-09-03 PROCEDURE — 72148 MRI LUMBAR SPINE W/O DYE: CPT

## 2021-12-20 ENCOUNTER — HOSPITAL ENCOUNTER (OUTPATIENT)
Dept: RADIOLOGY | Facility: HOSPITAL | Age: 71
Discharge: HOME | End: 2021-12-20
Payer: COMMERCIAL

## 2021-12-20 DIAGNOSIS — M25.571 ACUTE RIGHT ANKLE PAIN: ICD-10-CM

## 2021-12-20 PROCEDURE — 73610 X-RAY EXAM OF ANKLE: CPT

## 2024-06-19 ENCOUNTER — EVALUATION (OUTPATIENT)
Dept: PHYSICAL THERAPY | Facility: CLINIC | Age: 74
End: 2024-06-19
Payer: COMMERCIAL

## 2024-06-19 DIAGNOSIS — S46.911A STRAIN OF SHOULDER, RIGHT, INITIAL ENCOUNTER: Primary | ICD-10-CM

## 2024-06-19 PROCEDURE — 97161 PT EVAL LOW COMPLEX 20 MIN: CPT

## 2024-06-19 PROCEDURE — 97530 THERAPEUTIC ACTIVITIES: CPT

## 2024-06-19 PROCEDURE — 97110 THERAPEUTIC EXERCISES: CPT

## 2024-06-19 PROCEDURE — 97140 MANUAL THERAPY 1/> REGIONS: CPT

## 2024-06-19 NOTE — PROGRESS NOTES
"PT Evaluation     Today's date: 2024  Patient name: Serenity Anaya  : 1950  MRN: [id number]  Referring provider: Cheli Anne DO  Dx:   Encounter Diagnosis     ICD-10-CM    1. Strain of shoulder, right, initial encounter  S46.911A           Start Time: 1300  Stop Time: 1400  Total time in clinic (min): 60 minutes    Assessment  Impairments: abnormal muscle firing, abnormal or restricted ROM, activity intolerance, impaired physical strength, pain with function, scapular dyskinesis, poor posture  and poor body mechanics  Symptom irritability: high    Assessment details: Patient is a 74 y/o female who presents to physical therapy with R shoulder pain.  TAYLOR consists of patient opening her storm door window and \"jarring\" her shoulder about 1 month ago.  Patient does have objective findings and subjective findings consistent with rotator cuff injury and potential tear.  Patient has had no imaging yet.  Patient deficits include decreased shoulder AROM, decreased shoulder strength, postural deficits, soft tissue dysfunction, and impaired ADL performance.  PT is performing manual therapy consisting of GH mobilizations and upper trap STM to improve mobility and decrease pain.  Patient has begun therex in order to improve shoulder mobility and strength.  TherAct was initiated to improve reaching and lifting.  Plan to progress patient with strengthening and mobility exercises.  Will monitor and if lack of progress is seen with conservative treatment will refer out to ortho for imaging of cuff musculature.     Barriers to therapy: Poor exercise comprehension  Understanding of Dx/Px/POC: good     Prognosis: good    Goals  STG 1  Patient will decrease R shoulder pain to 2/10 at worst to improve tolerance to therapy in 4 weeks.     STG 2  Patient will report 50% improvement in overall condition in 4 weeks to display improved quality of life.    STG 3  Patient will display shoulder active elevation of 170 or " greater in all planes to display improved functional mobility in 4 weeks.      LTG 1 Patient will report no pain in R shoulder to display improved ability to perform ADLs in 8 weeks.    LTG 2  Patient will display 5/5 BUE strength to display improved functional lifting and reaching.     Plan  Patient would benefit from: PT eval  Referral necessary: No  Planned modality interventions: TENS, thermotherapy: hydrocollator packs and cryotherapy    Planned therapy interventions: IASTM, joint mobilization, manual therapy, kinesiology taping, nerve gliding, patient education, postural training, strengthening, stretching, therapeutic activities, therapeutic exercise, home exercise program, functional ROM exercises, flexibility and ADL training    Frequency: 2x week  Duration in weeks: 8  Plan of Care beginning date: 2024  Plan of Care expiration date: 2024      Subjective Evaluation    History of Present Illness  Date of onset: 2024  Mechanism of injury: Patient reports she was pulling down the storm window in her door about a month ago and ángel her R shoulder.  Patient previously had a RCR on the L but no previous issues with the R shoulder.  Patient denies numbness and tingling in the R arm.  Patient reports the arm has minimal pain at rest but is aggravated by all motions, especially reaching to the side.  Patient reports the pain has stayed about the same sine the initial injury.           Not a recurrent problem   Quality of life: good    Patient Goals  Patient goals for therapy: decreased pain, increased motion, increased strength, independence with ADLs/IADLs and return to sport/leisure activities    Pain  Current pain ratin  At best pain ratin  At worst pain ratin  Quality: sharp, tight and discomfort  Relieving factors: change in position, medications, ice, relaxation, rest and support  Aggravating factors: overhead activity and lifting  Progression: no change    Social Support  Lives  "in: one-story house  Lives with: alone    Hand dominance: left      Diagnostic Tests  No diagnostic tests performed  Treatments  Current treatment: physical therapy        Objective     Tests     Right Shoulder   Positive drop arm and empty can.     General Comments:      Shoulder Comments   Cervical ROM  FLX: 40  EXT: 20  SB R/L: 15/15  ROT R/L: 65/65    Shoulder ROM R:  FLX: 160  ABD: 120  ER: 90  IR: 75    UE MMT:      Shoulder            FLX: 4-           ABD: 4-           ER: 4+           IR: 4+        Moderate TTP over SS tendon                    Precautions: N/A      Manuals 6/19            R Post, Inf, combined GH mobs, UT STM 10' AT                                                   Neuro Re-Ed                                                                                                        Ther Ex             Scap Ret 20x5\"            Thoracic EXT S 20x5\"                                                                                          Ther Activity             Supine FLX HH 20x            Seated Eccentric Scaption W/ Dowel 20x            Gait Training                                       Modalities                                            "

## 2024-06-19 NOTE — LETTER
"2024    Cheli Anne DO  [address] [facility] [address]    Patient: Serenity Anaya   YOB: 1950   Date of Visit: 2024     Encounter Diagnosis     ICD-10-CM    1. Strain of shoulder, right, initial encounter  S46.911A           Dear Dr. Anne:    Thank you for your recent referral of Serenity Anaya. Please review the attached evaluation summary from Serenity's recent visit.     Please verify that you agree with the plan of care by signing the attached order.     If you have any questions or concerns, please do not hesitate to call.     I sincerely appreciate the opportunity to share in the care of one of your patients and hope to have another opportunity to work with you in the near future.       Sincerely,    Jorge Alberto Magaña, PT      Referring Provider:      I certify that I have read the below Plan of Care and certify the need for these services furnished under this plan of treatment while under my care.                    Cheli Anne DO  [address] [facility] [address]  Via Fax: [phone]          PT Evaluation     Today's date: 2024  Patient name: Serenity Anaya  : 1950  MRN: [id number]  Referring provider: Cheli Anne DO  Dx:   Encounter Diagnosis     ICD-10-CM    1. Strain of shoulder, right, initial encounter  S46.911A           Start Time: 1300  Stop Time: 1400  Total time in clinic (min): 60 minutes    Assessment  Impairments: abnormal muscle firing, abnormal or restricted ROM, activity intolerance, impaired physical strength, pain with function, scapular dyskinesis, poor posture  and poor body mechanics  Symptom irritability: high    Assessment details: Patient is a 72 y/o female who presents to physical therapy with R shoulder pain.  TAYLOR consists of patient opening her storm door window and \"jarring\" her shoulder about 1 month ago.  Patient does have objective findings and subjective findings consistent with " rotator cuff injury and potential tear.  Patient has had no imaging yet.  Patient deficits include decreased shoulder AROM, decreased shoulder strength, postural deficits, soft tissue dysfunction, and impaired ADL performance.  PT is performing manual therapy consisting of GH mobilizations and upper trap STM to improve mobility and decrease pain.  Patient has begun therex in order to improve shoulder mobility and strength.  TherAct was initiated to improve reaching and lifting.  Plan to progress patient with strengthening and mobility exercises.  Will monitor and if lack of progress is seen with conservative treatment will refer out to ortho for imaging of cuff musculature.     Barriers to therapy: Poor exercise comprehension  Understanding of Dx/Px/POC: good     Prognosis: good    Goals  STG 1  Patient will decrease R shoulder pain to 2/10 at worst to improve tolerance to therapy in 4 weeks.     STG 2  Patient will report 50% improvement in overall condition in 4 weeks to display improved quality of life.    STG 3  Patient will display shoulder active elevation of 170 or greater in all planes to display improved functional mobility in 4 weeks.      LTG 1 Patient will report no pain in R shoulder to display improved ability to perform ADLs in 8 weeks.    LTG 2  Patient will display 5/5 BUE strength to display improved functional lifting and reaching.     Plan  Patient would benefit from: PT eval  Referral necessary: No  Planned modality interventions: TENS, thermotherapy: hydrocollator packs and cryotherapy    Planned therapy interventions: IASTM, joint mobilization, manual therapy, kinesiology taping, nerve gliding, patient education, postural training, strengthening, stretching, therapeutic activities, therapeutic exercise, home exercise program, functional ROM exercises, flexibility and ADL training    Frequency: 2x week  Duration in weeks: 8  Plan of Care beginning date: 6/19/2024  Plan of Care expiration date:  "2024      Subjective Evaluation    History of Present Illness  Date of onset: 2024  Mechanism of injury: Patient reports she was pulling down the storm window in her door about a month ago and ángel her R shoulder.  Patient previously had a RCR on the L but no previous issues with the R shoulder.  Patient denies numbness and tingling in the R arm.  Patient reports the arm has minimal pain at rest but is aggravated by all motions, especially reaching to the side.  Patient reports the pain has stayed about the same sine the initial injury.           Not a recurrent problem   Quality of life: good    Patient Goals  Patient goals for therapy: decreased pain, increased motion, increased strength, independence with ADLs/IADLs and return to sport/leisure activities    Pain  Current pain ratin  At best pain ratin  At worst pain ratin  Quality: sharp, tight and discomfort  Relieving factors: change in position, medications, ice, relaxation, rest and support  Aggravating factors: overhead activity and lifting  Progression: no change    Social Support  Lives in: one-story house  Lives with: alone    Hand dominance: left      Diagnostic Tests  No diagnostic tests performed  Treatments  Current treatment: physical therapy        Objective     Tests     Right Shoulder   Positive drop arm and empty can.     General Comments:      Shoulder Comments   Cervical ROM  FLX: 40  EXT: 20  SB R/L: 15/15  ROT R/L: 65/65    Shoulder ROM R:  FLX: 160  ABD: 120  ER: 90  IR: 75    UE MMT:      Shoulder            FLX: 4-           ABD: 4-           ER: 4+           IR: 4+        Moderate TTP over SS tendon                    Precautions: N/A      Manuals             R Post, Inf, combined GH mobs, UT STM 10' AT                                                   Neuro Re-Ed                                                                                                        Ther Ex             Scap Ret 20x5\"          " "  Thoracic EXT S 20x5\"                                                                                          Ther Activity             Supine FLX HH 20x            Seated Eccentric Scaption W/ Keeganel 20x            Gait Training                                       Modalities                                                            "

## 2024-06-20 ENCOUNTER — OFFICE VISIT (OUTPATIENT)
Dept: PHYSICAL THERAPY | Facility: CLINIC | Age: 74
End: 2024-06-20
Payer: COMMERCIAL

## 2024-06-20 DIAGNOSIS — S46.911A STRAIN OF SHOULDER, RIGHT, INITIAL ENCOUNTER: Primary | ICD-10-CM

## 2024-06-20 PROCEDURE — 97530 THERAPEUTIC ACTIVITIES: CPT

## 2024-06-20 PROCEDURE — 97110 THERAPEUTIC EXERCISES: CPT

## 2024-06-20 PROCEDURE — 97140 MANUAL THERAPY 1/> REGIONS: CPT

## 2024-06-20 NOTE — PROGRESS NOTES
"Daily Note     Today's date: 2024  Patient name: Serenity Anaya  : 1950  MRN: [id number]  Referring provider: Cheli Anne DO  Dx:   Encounter Diagnosis     ICD-10-CM    1. Strain of shoulder, right, initial encounter  S46.911A           Start Time: 1300  Stop Time: 1345  Total time in clinic (min): 45 minutes    Subjective: Patient reports her HEP is going well. She reports her shoulder pain is about the same today.      Objective: See treatment diary below      Assessment: Tolerated treatment well. Patient exhibited good technique with therapeutic exercises and would benefit from continued PT.  PT performed manual therapy consisting of GH mobilizations to improve mobility and decrease pain.  Patient continued TherEx to improve shoulder strength and mobility.  TherAct was continued in order to improve reaching and lifting.  Patient was progressed as per flowsheet  Patient would benefit from skilled PT to address functional deficits and pain.       Plan: Continue per plan of care.  Progress treatment as tolerated.       Precautions: N/A      Manuals            R Post, Inf, combined GH mobs, UT STM 10' AT 10' AT                                                  Neuro Re-Ed                                                                                                        Ther Ex             Scap Ret 20x5\" 20x5'           Thoracic EXT S 20x5\" 20x5\"           Tband Ret/Ext  20x                                                                            Ther Activity             Supine FLX HH 20x 20x           Seated Eccentric Scaption W/ Dowel 20x 20x           Pulleys Scap +FLX  3' ea           Gait Training                                       Modalities                                            "

## 2024-06-25 ENCOUNTER — OFFICE VISIT (OUTPATIENT)
Dept: PHYSICAL THERAPY | Facility: CLINIC | Age: 74
End: 2024-06-25
Payer: COMMERCIAL

## 2024-06-25 DIAGNOSIS — S46.911A STRAIN OF SHOULDER, RIGHT, INITIAL ENCOUNTER: Primary | ICD-10-CM

## 2024-06-25 PROCEDURE — 97140 MANUAL THERAPY 1/> REGIONS: CPT

## 2024-06-25 PROCEDURE — 97530 THERAPEUTIC ACTIVITIES: CPT

## 2024-06-25 NOTE — PROGRESS NOTES
"Daily Note     Today's date: 2024  Patient name: Serenity Anaya  : 1950  MRN: [id number]  Referring provider: Cheli Anne DO  Dx:   Encounter Diagnosis     ICD-10-CM    1. Strain of shoulder, right, initial encounter  S46.911A           Start Time: 1015  Stop Time: 1100  Total time in clinic (min): 45 minutes    Subjective: Patient reports her shoulder is gradually improving.  No new complaints today.      Objective: See treatment diary below      Assessment: Tolerated treatment well. Patient would benefit from continued PT.  PT performed manual therapy consisting of GH mobilizations and STM to improve mobility and decrease pain in R shoulder.  Patient continued TherEx to improve shoulder mobility and cuff strength. TherAct was continued in order to improve reaching and lifting ability.  Patient would benefit from skilled PT to address functional deficits and pain.       Plan: Continue per plan of care.  Progress treatment as tolerated.       Precautions: N/A      Manuals           R Post, Inf, combined GH mobs, UT STM 10' AT 10' AT 10' AT                                                 Neuro Re-Ed                                                                                                        Ther Ex             Scap Ret 20x5\" 20x5\" 20x5\"          Thoracic EXT S 20x5\" 20x5\" 20x5\"          Tband Ret/Ext  20x L2 20x L2          Tband ER/IR w/ towel roll R   20x L2                                                              Ther Activity             Supine FLX HH 20x 20x 20x w/ stick + BP          Seated Eccentric Scaption W/ Dowel 20x 20x 20x          Pulleys Scap +FLX  3' ea 3' ea          Gait Training                                       Modalities                                              "

## 2024-06-28 ENCOUNTER — OFFICE VISIT (OUTPATIENT)
Dept: PHYSICAL THERAPY | Facility: CLINIC | Age: 74
End: 2024-06-28
Payer: COMMERCIAL

## 2024-06-28 DIAGNOSIS — S46.911A STRAIN OF SHOULDER, RIGHT, INITIAL ENCOUNTER: Primary | ICD-10-CM

## 2024-06-28 PROCEDURE — 97530 THERAPEUTIC ACTIVITIES: CPT

## 2024-06-28 PROCEDURE — 97140 MANUAL THERAPY 1/> REGIONS: CPT

## 2024-06-28 PROCEDURE — 97116 GAIT TRAINING THERAPY: CPT

## 2024-06-28 NOTE — PROGRESS NOTES
"Daily Note     Today's date: 2024  Patient name: Serenity Anaya  : 1950  MRN: [id number]  Referring provider: Cheli Anne DO  Dx:   Encounter Diagnosis     ICD-10-CM    1. Strain of shoulder, right, initial encounter  S46.911A           Start Time: 1015  Stop Time: 1100  Total time in clinic (min): 45 minutes    Subjective: Patient reports her shoulder was doing better but she aggravated it putting her purse in her console before coming in.      Objective: See treatment diary below      Assessment: Tolerated treatment well. Patient exhibited good technique with therapeutic exercises and would benefit from continued PT.  PT performed manual therapy consisting of GH mobilizations and UT STM to improve mobility and decrease pain in R shoulder.  Patient continued TherEx to improve shoulder mobility and strength.  TherAct was continued in order to improve ADL performance and lifting.  Patient would benefit from skilled PT to address functional deficits and pain.       Plan: Continue per plan of care.  Progress treatment as tolerated.       Precautions: N/A      Manuals          R Post, Inf, combined GH mobs, UT STM 10' AT 10' AT 10' AT 10' AT                                                Neuro Re-Ed                                                                                                        Ther Ex             Scap Ret 20x5\" 20x5\" 20x5\" 20x5\"         Thoracic EXT S 20x5\" 20x5\" 20x5\" HEP         Tband Ret/Ext  20x L2 20x L2 20x L2         Tband ER/IR w/ towel roll R   20x L2 20x L2         UEB FWD`    5'         Open Books     20x ea                                   Ther Activity             Supine FLX HH 20x 20x 20x w/ stick + BP 20x w/ stick + BP         Seated Eccentric Scaption W/ Dowel 20x 20x 20x 20x          Pulleys Scap +FLX  3' ea 3' ea 3' ea          Gait Training                                       Modalities                                                "

## 2024-07-02 ENCOUNTER — APPOINTMENT (OUTPATIENT)
Dept: PHYSICAL THERAPY | Facility: CLINIC | Age: 74
End: 2024-07-02
Payer: COMMERCIAL

## 2024-07-05 ENCOUNTER — APPOINTMENT (OUTPATIENT)
Dept: PHYSICAL THERAPY | Facility: CLINIC | Age: 74
End: 2024-07-05
Payer: COMMERCIAL

## 2024-07-09 ENCOUNTER — OFFICE VISIT (OUTPATIENT)
Dept: PHYSICAL THERAPY | Facility: CLINIC | Age: 74
End: 2024-07-09
Payer: COMMERCIAL

## 2024-07-09 DIAGNOSIS — S46.911A STRAIN OF SHOULDER, RIGHT, INITIAL ENCOUNTER: Primary | ICD-10-CM

## 2024-07-09 PROCEDURE — 97140 MANUAL THERAPY 1/> REGIONS: CPT

## 2024-07-09 PROCEDURE — 97530 THERAPEUTIC ACTIVITIES: CPT

## 2024-07-09 PROCEDURE — 97116 GAIT TRAINING THERAPY: CPT

## 2024-07-09 NOTE — PROGRESS NOTES
"Daily Note     Today's date: 2024  Patient name: Serenity Anaya  : 1950  MRN: [id number]  Referring provider: Cheli Anne DO  Dx:   Encounter Diagnosis     ICD-10-CM    1. Strain of shoulder, right, initial encounter  S46.911A           Start Time: 1015  Stop Time: 1100  Total time in clinic (min): 45 minutes    Subjective: Patient reports her shoulder feels about the same since starting PT.  She reports she has no pain at rest but significant pain when using it.      Objective: See treatment diary below      Assessment: Tolerated treatment fair. Patient would benefit from continued PT.  PT recommended patient schedule with orthopedics to assess for rotator cuff tear as conservative treatment has made minimal effect in symptoms after 3 weeks.  Visits were inconsistent but patient does report consistency with HEP.  Strengthening will continue to be progressed as well as manual therapy to improve mobility of shoulder.  Patient did note during some exercises her L shoulder had some pain.  PT reassessed rotator cuff testing.  Cuff tests were questionable.          Plan: Continue per plan of care.  Progress treatment as tolerated.   Patient recommended to make ortho appointment.     Precautions: N/A      Manuals         R Post, Inf, combined GH mobs, UT STM 10' AT 10' AT 10' AT 10' AT 10' AT                                               Neuro Re-Ed                                                                                                        Ther Ex             Scap Ret 20x5\" 20x5\" 20x5\" 20x5\" 20x5\"        Thoracic EXT S 20x5\" 20x5\" 20x5\" HEP         Tband Ret/Ext  20x L2 20x L2 20x L2 20x L2        Tband ER/IR w/ towel roll R   20x L2 20x L2 20x L2        UEB FWD`    5' 5'        Open Books     20x ea 20x ea                                  Ther Activity             Supine FLX HH 20x 20x 20x w/ stick + BP 20x w/ stick + BP 20x w/ stick + BP        Seated Eccentric " Scaption W/ Dowel 20x 20x 20x 20x  20x        Pulleys Scap +FLX  3' ea 3' ea 3' ea  3'ea        Gait Training                                       Modalities

## 2024-07-12 ENCOUNTER — OFFICE VISIT (OUTPATIENT)
Dept: PHYSICAL THERAPY | Facility: CLINIC | Age: 74
End: 2024-07-12
Payer: COMMERCIAL

## 2024-07-12 DIAGNOSIS — S46.911A STRAIN OF SHOULDER, RIGHT, INITIAL ENCOUNTER: Primary | ICD-10-CM

## 2024-07-12 PROCEDURE — 97140 MANUAL THERAPY 1/> REGIONS: CPT

## 2024-07-12 PROCEDURE — 97530 THERAPEUTIC ACTIVITIES: CPT

## 2024-07-12 PROCEDURE — 97116 GAIT TRAINING THERAPY: CPT

## 2024-07-12 NOTE — PROGRESS NOTES
"Daily Note     Today's date: 2024  Patient name: Serenity Anaya  : 1950  MRN: [id number]  Referring provider: Cheli Anne DO  Dx:   Encounter Diagnosis     ICD-10-CM    1. Strain of shoulder, right, initial encounter  S46.911A           Start Time: 1015  Stop Time: 1100  Total time in clinic (min): 45 minutes    Subjective: Patient reports her pain is the same.  She forgot to contact her PCP for a referral to ortho.      Objective: See treatment diary below      Assessment: Tolerated treatment well. Patient would benefit from continued PT  PT recommended patient follow up with ortho.  Patient plans to call to schedule.  Patient continued exercises as outlined in flowsheet in order to improve strength and shoulder mobility.  PT continued mobilizations to R shoulder with minimal change.  Patient wishes to be on hold until she sees ortho as she feels PT is not helping.  Will contact patient in 2 weeks to follow up.      Plan: Continue per plan of care.  Progress treatment as tolerated.       Precautions: N/A      Manuals        R Post, Inf, combined GH mobs, UT STM 10' AT 10' AT 10' AT 10' AT 10' AT 10' AT                                              Neuro Re-Ed                                                                                                        Ther Ex             Scap Ret 20x5\" 20x5\" 20x5\" 20x5\" 20x5\" 20x 5\"       Thoracic EXT S 20x5\" 20x5\" 20x5\" HEP         Tband Ret/Ext  20x L2 20x L2 20x L2 20x L2 20x L2       Tband ER/IR w/ towel roll R   20x L2 20x L2 20x L2 20x L2       UEB FWD`    5' 5' 5'       Open Books     20x ea 20x ea 20x ea                                 Ther Activity             Supine FLX HH 20x 20x 20x w/ stick + BP 20x w/ stick + BP 20x w/ stick + BP 20x w/ stick + BP       Seated Eccentric Scaption W/ Dowel 20x 20x 20x 20x  20x 20x       Pulleys Scap +FLX  3' ea 3' ea 3' ea  3'ea 3' ea        Gait Training                        "                Modalities

## 2024-08-06 ENCOUNTER — OFFICE VISIT (OUTPATIENT)
Dept: URGENT CARE | Facility: MEDICAL CENTER | Age: 74
End: 2024-08-06
Payer: COMMERCIAL

## 2024-08-06 VITALS
TEMPERATURE: 98.2 F | DIASTOLIC BLOOD PRESSURE: 68 MMHG | HEIGHT: 63 IN | WEIGHT: 180 LBS | RESPIRATION RATE: 18 BRPM | SYSTOLIC BLOOD PRESSURE: 138 MMHG | HEART RATE: 86 BPM | OXYGEN SATURATION: 98 % | BODY MASS INDEX: 31.89 KG/M2

## 2024-08-06 DIAGNOSIS — L25.9 CONTACT DERMATITIS, UNSPECIFIED CONTACT DERMATITIS TYPE, UNSPECIFIED TRIGGER: Primary | ICD-10-CM

## 2024-08-06 PROCEDURE — 99213 OFFICE O/P EST LOW 20 MIN: CPT | Performed by: PHYSICIAN ASSISTANT

## 2024-08-06 PROCEDURE — S9088 SERVICES PROVIDED IN URGENT: HCPCS | Performed by: PHYSICIAN ASSISTANT

## 2024-08-06 RX ORDER — FAMOTIDINE 40 MG/1
40 TABLET, FILM COATED ORAL
COMMUNITY

## 2024-08-06 RX ORDER — PREDNISONE 20 MG/1
TABLET ORAL
Qty: 12 TABLET | Refills: 0 | Status: SHIPPED | OUTPATIENT
Start: 2024-08-06

## 2024-08-06 RX ORDER — DICYCLOMINE HYDROCHLORIDE 10 MG/1
10 CAPSULE ORAL
COMMUNITY

## 2024-08-06 RX ORDER — SENNOSIDES 8.6 MG
650 CAPSULE ORAL EVERY 8 HOURS PRN
COMMUNITY

## 2024-08-06 RX ORDER — GABAPENTIN 300 MG/1
300 CAPSULE ORAL 2 TIMES DAILY
COMMUNITY

## 2024-08-06 RX ORDER — BACLOFEN 10 MG/1
10 TABLET ORAL 3 TIMES DAILY
COMMUNITY

## 2024-08-06 RX ORDER — ASPIRIN 325 MG
325 TABLET, DELAYED RELEASE (ENTERIC COATED) ORAL DAILY
COMMUNITY

## 2024-08-06 RX ORDER — LEVOTHYROXINE SODIUM 0.12 MG/1
88 TABLET ORAL DAILY
COMMUNITY

## 2024-08-06 RX ORDER — TOPIRAMATE SPINKLE 25 MG/1
25 CAPSULE ORAL 2 TIMES DAILY
COMMUNITY

## 2024-08-06 RX ORDER — METHYLPHENIDATE HYDROCHLORIDE EXTENDED RELEASE 10 MG/1
20 TABLET ORAL EVERY MORNING
COMMUNITY

## 2024-08-06 RX ORDER — LOSARTAN POTASSIUM 50 MG/1
50 TABLET ORAL DAILY
COMMUNITY

## 2024-08-06 RX ORDER — OMEPRAZOLE 40 MG/1
40 CAPSULE, DELAYED RELEASE ORAL DAILY
COMMUNITY

## 2024-08-06 RX ORDER — AMOXICILLIN 500 MG/1
500 CAPSULE ORAL EVERY 8 HOURS SCHEDULED
COMMUNITY

## 2024-08-06 RX ORDER — NORTRIPTYLINE HYDROCHLORIDE 25 MG/1
25 CAPSULE ORAL
COMMUNITY

## 2024-08-06 NOTE — PROGRESS NOTES
[facility]        NAME: Serenity Anaya is a 73 y.o. female  : 1950    MRN: [id number]  DATE: 2024  TIME: 4:05 PM    Assessment and Plan   Contact dermatitis, unspecified contact dermatitis type, unspecified trigger [L25.9]  1. Contact dermatitis, unspecified contact dermatitis type, unspecified trigger  predniSONE 20 mg tablet            Patient Instructions     Start Prednisone  If symptoms fail to improve follow up with PCP    Follow up with PCP in 3-5 days.  Proceed to  ER if symptoms worsen.    If tests have been performed at [facility], our office will contact you with results if changes need to be made to the care plan discussed with you at the visit.  [facility]    Chief Complaint     Chief Complaint   Patient presents with   • Rash     Generalized body rash to arms legs and face after getting bitten on left arm on Friday. Papular rash.         History of Present Illness       Patient presents with a 5-day history of an itchy rash primarily on her lower extremities.  The rash also spread to her left cheek.  She also has ulcers in her mouth.  The rash is very itchy she has been applying hydrogen peroxide followed by Neosporin.  Also taking Zyrtec over-the-counter for the itching which seems to help.  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 No changes in lotions, soaps or detergents.  No changes in medication.        Review of Systems   Review of Systems   Constitutional:  Negative for chills and fever.   HENT:  Negative for trouble swallowing.    Respiratory:  Negative for cough.    Skin:  Positive for rash.         Current Medications       Current Outpatient Medications:   •  acetaminophen (TYLENOL) 650 mg CR tablet, Take 650 mg by mouth every 8 (eight) hours as needed for mild pain, Disp: , Rfl:   •  amoxicillin (AMOXIL) 500 mg capsule, Take 500 mg by mouth every 8 (eight) hours Prior to dental  procedures, Disp: , Rfl:   •  aspirin (ECOTRIN) 325 mg EC tablet, Take 325 mg by mouth daily, Disp: , Rfl:   •  baclofen 10 mg tablet, Take 10 mg by mouth 3 (three) times a day, Disp: , Rfl:   •  Diclofenac Sodium (VOLTAREN) 1 %, Apply 2 g topically 4 (four) times a day, Disp: , Rfl:   •  dicyclomine (BENTYL) 10 mg capsule, Take 10 mg by mouth 4 (four) times a day (before meals and at bedtime), Disp: , Rfl:   •  famotidine (PEPCID) 40 MG tablet, Take 40 mg by mouth daily at bedtime as needed for heartburn, Disp: , Rfl:   •  gabapentin (NEURONTIN) 300 mg capsule, Take 300 mg by mouth 2 (two) times a day, Disp: , Rfl:   •  levothyroxine 125 mcg tablet, Take 88 mcg by mouth daily, Disp: , Rfl:   •  losartan (COZAAR) 50 mg tablet, Take 50 mg by mouth daily, Disp: , Rfl:   •  methylphenidate (METADATE ER) 10 MG ER tablet, Take 20 mg by mouth every morning, Disp: , Rfl:   •  metoprolol tartrate (LOPRESSOR) 25 mg tablet, Take 25 mg by mouth 3 (three) times a week, Disp: , Rfl:   •  nortriptyline (PAMELOR) 25 mg capsule, Take 25 mg by mouth daily at bedtime, Disp: , Rfl:   •  omeprazole (PriLOSEC) 40 MG capsule, Take 40 mg by mouth daily, Disp: , Rfl:   •  predniSONE 20 mg tablet, 2 tabs daily x 5 days then 1 tab daily x 2 days, Disp: 12 tablet, Rfl: 0  •  topiramate (TOPAMAX) 25 mg sprinkle capsule, Take 25 mg by mouth 2 (two) times a day, Disp: , Rfl:     Current Allergies     Allergies as of 08/06/2024 - Reviewed 08/06/2024   Allergen Reaction Noted   • Statins Arthralgia 11/06/2020            The following portions of the patient's history were reviewed and updated as appropriate: allergies, current medications, past family history, past medical history, past social history, past surgical history and problem list.     No past medical history on file.    No past surgical history on file.    No family history on file.      Medications have been verified.        Objective   /68   Pulse 86   Temp 98.2 °F (36.8 °C)   " Resp 18   Ht 5' 3\" (1.6 m)   Wt 81.6 kg (180 lb)   SpO2 98%   BMI 31.89 kg/m²   No LMP recorded.       Physical Exam     Physical Exam  Vitals and nursing note reviewed.   Constitutional:       Appearance: Normal appearance.   HENT:      Head: Normocephalic and atraumatic.      Mouth/Throat:      Mouth: Mucous membranes are moist.      Comments: Several aphthous ulcers beneath her tongue and right gingiva.  Cardiovascular:      Rate and Rhythm: Normal rate.   Pulmonary:      Effort: Pulmonary effort is normal.   Skin:     General: Skin is warm.      Comments: Maculopapular rash of the lower extremities with some linear component with overlying vesicles consistent with contact dermatitis secondary to poison.  She also has a maculopapular rash of her left cheek.   Neurological:      Mental Status: She is alert.                   "

## 2024-08-14 ENCOUNTER — HOSPITAL ENCOUNTER (OUTPATIENT)
Dept: MRI IMAGING | Facility: HOSPITAL | Age: 74
Discharge: HOME | End: 2024-08-14
Payer: COMMERCIAL

## 2024-08-14 DIAGNOSIS — G89.29 OTHER CHRONIC PAIN: ICD-10-CM

## 2024-08-14 DIAGNOSIS — M25.511 PAIN IN RIGHT SHOULDER: ICD-10-CM

## 2024-08-14 DIAGNOSIS — M12.811 OTHER SPECIFIC ARTHROPATHIES, NOT ELSEWHERE CLASSIFIED, RIGHT SHOULDER: ICD-10-CM

## 2024-08-14 PROCEDURE — 73221 MRI JOINT UPR EXTREM W/O DYE: CPT

## 2024-08-26 ENCOUNTER — APPOINTMENT (OUTPATIENT)
Dept: RADIOLOGY | Facility: MEDICAL CENTER | Age: 74
End: 2024-08-26
Payer: COMMERCIAL

## 2024-08-26 ENCOUNTER — OFFICE VISIT (OUTPATIENT)
Dept: URGENT CARE | Facility: MEDICAL CENTER | Age: 74
End: 2024-08-26
Payer: COMMERCIAL

## 2024-08-26 VITALS
RESPIRATION RATE: 18 BRPM | DIASTOLIC BLOOD PRESSURE: 68 MMHG | SYSTOLIC BLOOD PRESSURE: 122 MMHG | WEIGHT: 180 LBS | BODY MASS INDEX: 31.89 KG/M2 | HEART RATE: 89 BPM | TEMPERATURE: 98 F | OXYGEN SATURATION: 96 % | HEIGHT: 63 IN

## 2024-08-26 DIAGNOSIS — T14.8XXA MUSCLE STRAIN: Primary | ICD-10-CM

## 2024-08-26 DIAGNOSIS — S89.92XA LEFT KNEE INJURY, INITIAL ENCOUNTER: ICD-10-CM

## 2024-08-26 DIAGNOSIS — M54.50 ACUTE RIGHT-SIDED LOW BACK PAIN WITHOUT SCIATICA: ICD-10-CM

## 2024-08-26 LAB
SL AMB  POCT GLUCOSE, UA: NORMAL
SL AMB LEUKOCYTE ESTERASE,UA: NORMAL
SL AMB POCT BILIRUBIN,UA: NORMAL
SL AMB POCT BLOOD,UA: NORMAL
SL AMB POCT CLARITY,UA: CLEAR
SL AMB POCT COLOR,UA: NORMAL
SL AMB POCT KETONES,UA: NORMAL
SL AMB POCT NITRITE,UA: NORMAL
SL AMB POCT PH,UA: 5
SL AMB POCT SPECIFIC GRAVITY,UA: 1
SL AMB POCT URINE PROTEIN: NORMAL
SL AMB POCT UROBILINOGEN: 0.2

## 2024-08-26 PROCEDURE — S9088 SERVICES PROVIDED IN URGENT: HCPCS | Performed by: PHYSICIAN ASSISTANT

## 2024-08-26 PROCEDURE — 81002 URINALYSIS NONAUTO W/O SCOPE: CPT | Performed by: PHYSICIAN ASSISTANT

## 2024-08-26 PROCEDURE — 99213 OFFICE O/P EST LOW 20 MIN: CPT | Performed by: PHYSICIAN ASSISTANT

## 2024-08-26 PROCEDURE — 73564 X-RAY EXAM KNEE 4 OR MORE: CPT

## 2024-08-26 NOTE — PROGRESS NOTES
[facility]        NAME: Serenity Anaya is a 73 y.o. female  : 1950    MRN: [id number]  DATE: 2024  TIME: 11:10 AM    Assessment and Plan   Muscle strain [T14.8XXA]  1. Muscle strain        2. Acute right-sided low back pain without sciatica  POCT urine dip      3. Left knee injury, initial encounter  XR knee 4+ vw left injury            Patient Instructions     Take Tylenol for pain  Take previously prescribed Baclofen for right flank pain  Follow up with orthopedics if knee pain fail to improve    Follow up with PCP in 3-5 days.  Proceed to  ER if symptoms worsen.    If tests have been performed at [facility], our office will contact you with results if changes need to be made to the care plan discussed with you at the visit.  You can review your full results on [facility].    Chief Complaint     Chief Complaint   Patient presents with   • Knee Pain     Fell on 8/10 injuring left knee and left lower leg, feels tight   • Cough     Cough for 12 days that is dry, non productive.  Negative covid test yesterday   • Back Pain     Pain in right lower back for 1 week         History of Present Illness       Patient fell on August 10, 2024, when her foot caught a rug and she fell onto her left side. Patient having continued left knee pain. Had bilateral knee replacements. Also has right flank pain which occurs when she is standing or twisting. A few days ago she started with bruising of her left lower leg, sore to touch. Since going for MRIs of her shoulders is having a dry cough. Taking Mucinex. No further cold like symptoms.         Review of Systems   Review of Systems   Constitutional:  Negative for chills and fever.   HENT:  Negative for congestion, rhinorrhea and sore throat.    Respiratory:  Positive for cough. Negative for chest tightness and shortness of breath.    Musculoskeletal:         Left knee pain, right flank pain   Skin:  Positive for color change (bruise left lower  leg).         Current Medications       Current Outpatient Medications:   •  acetaminophen (TYLENOL) 650 mg CR tablet, Take 650 mg by mouth every 8 (eight) hours as needed for mild pain, Disp: , Rfl:   •  aspirin (ECOTRIN) 325 mg EC tablet, Take 325 mg by mouth daily, Disp: , Rfl:   •  baclofen 10 mg tablet, Take 10 mg by mouth 3 (three) times a day, Disp: , Rfl:   •  Diclofenac Sodium (VOLTAREN) 1 %, Apply 2 g topically 4 (four) times a day, Disp: , Rfl:   •  dicyclomine (BENTYL) 10 mg capsule, Take 10 mg by mouth 4 (four) times a day (before meals and at bedtime), Disp: , Rfl:   •  famotidine (PEPCID) 40 MG tablet, Take 40 mg by mouth daily at bedtime as needed for heartburn, Disp: , Rfl:   •  gabapentin (NEURONTIN) 300 mg capsule, Take 300 mg by mouth 2 (two) times a day, Disp: , Rfl:   •  levothyroxine 125 mcg tablet, Take 88 mcg by mouth daily, Disp: , Rfl:   •  losartan (COZAAR) 50 mg tablet, Take 50 mg by mouth daily, Disp: , Rfl:   •  methylphenidate (METADATE ER) 10 MG ER tablet, Take 20 mg by mouth every morning, Disp: , Rfl:   •  metoprolol tartrate (LOPRESSOR) 25 mg tablet, Take 25 mg by mouth 3 (three) times a week, Disp: , Rfl:   •  nortriptyline (PAMELOR) 25 mg capsule, Take 25 mg by mouth daily at bedtime, Disp: , Rfl:   •  omeprazole (PriLOSEC) 40 MG capsule, Take 40 mg by mouth daily, Disp: , Rfl:   •  topiramate (TOPAMAX) 25 mg sprinkle capsule, Take 25 mg by mouth 2 (two) times a day, Disp: , Rfl:   •  amoxicillin (AMOXIL) 500 mg capsule, Take 500 mg by mouth every 8 (eight) hours Prior to dental procedures (Patient not taking: Reported on 8/26/2024), Disp: , Rfl:   •  predniSONE 20 mg tablet, 2 tabs daily x 5 days then 1 tab daily x 2 days (Patient not taking: Reported on 8/26/2024), Disp: 12 tablet, Rfl: 0    Current Allergies     Allergies as of 08/26/2024 - Reviewed 08/26/2024   Allergen Reaction Noted   • Alendronate Other (See Comments) 08/31/2007   • Benazepril Other (See Comments)  "06/20/2011   • Saul-2 inhibitors (sulfonamide) Other (See Comments) 05/02/2001   • Pravastatin Other (See Comments) 06/26/2007   • Risedronate Myalgia 10/27/2010   • Rofecoxib Other (See Comments) 05/08/2001   • Rosuvastatin Myalgia 10/27/2010   • Simvastatin Other (See Comments) 08/31/2007   • Statins Arthralgia 11/06/2020            The following portions of the patient's history were reviewed and updated as appropriate: allergies, current medications, past family history, past medical history, past social history, past surgical history and problem list.     History reviewed. No pertinent past medical history.    History reviewed. No pertinent surgical history.    History reviewed. No pertinent family history.      Medications have been verified.        Objective   /68   Pulse 89   Temp 98 °F (36.7 °C) (Temporal)   Resp 18   Ht 5' 3\" (1.6 m)   Wt 81.6 kg (180 lb)   SpO2 96%   BMI 31.89 kg/m²   No LMP recorded.       Physical Exam     Physical Exam  Vitals and nursing note reviewed.   Constitutional:       Appearance: Normal appearance.   HENT:      Head: Normocephalic and atraumatic.   Cardiovascular:      Rate and Rhythm: Normal rate and regular rhythm.      Heart sounds: Normal heart sounds.   Pulmonary:      Effort: Pulmonary effort is normal.      Breath sounds: Normal breath sounds.   Musculoskeletal:      Comments: Left knee without TTP, left lateral lower leg with ecchymosis, no TTP, no palpable cords   Skin:     General: Skin is warm.   Neurological:      Mental Status: She is alert.       Xray independently reviewed by me contemporaneously as no acute osseous abnormality or acute process. Awaiting radiology interpretation.              " asthma - copd - sob - mata - spiriva - symbicort - proventil PRN - prednisone 20 mg PO 3 days -   robitussin for cough prn  will check CRP IgE Blood gas  ct chest reviewed -   pulm nodule - never smoker - sub cm - will need follow up as outpatient  pt will follow with Dr Lieberman in [place] for Pulmonary work up and PFT  ALBA - CPAP night time   on AC for AF - D dimer normal  discussed plan of care with pt and dtr  covid pcr neg

## 2024-08-26 NOTE — PATIENT INSTRUCTIONS
Take Tylenol for pain  Take previously prescribed Baclofen for right flank pain  Follow up with orthopedics if knee pain fail to improve    If tests have been performed at [facility], our office will contact you with results if changes need to be made to the care plan discussed with you at the visit.  You can review your full results on [facility]

## 2024-10-28 ENCOUNTER — EVALUATION (OUTPATIENT)
Dept: PHYSICAL THERAPY | Facility: CLINIC | Age: 74
End: 2024-10-28
Payer: COMMERCIAL

## 2024-10-28 DIAGNOSIS — G89.29 CHRONIC RIGHT SHOULDER PAIN: ICD-10-CM

## 2024-10-28 DIAGNOSIS — M25.511 CHRONIC RIGHT SHOULDER PAIN: ICD-10-CM

## 2024-10-28 DIAGNOSIS — Z09 FOLLOW-UP EXAMINATION, FOLLOWING OTHER SURGERY: Primary | ICD-10-CM

## 2024-10-28 PROCEDURE — 97140 MANUAL THERAPY 1/> REGIONS: CPT

## 2024-10-28 PROCEDURE — 97110 THERAPEUTIC EXERCISES: CPT

## 2024-10-28 PROCEDURE — 97161 PT EVAL LOW COMPLEX 20 MIN: CPT

## 2024-10-28 NOTE — PROGRESS NOTES
PT Evaluation     Today's date: 10/28/2024  Patient name: Serenity Anaya  : 1950  MRN: [id number]  Referring provider: Tiffanie Pa PA-C  Dx:   Encounter Diagnosis     ICD-10-CM    1. Follow-up examination, following other surgery  Z09       2. Chronic right shoulder pain  M25.511     G89.29           Start Time: 1345  Stop Time: 1430  Total time in clinic (min): 45 minutes    Assessment  Impairments: abnormal muscle firing, abnormal or restricted ROM, activity intolerance, impaired physical strength, pain with function, scapular dyskinesis, poor posture  and poor body mechanics  Symptom irritability: high    Assessment details: Patient is a 75 y/o female who presents to physical therapy with R shoulder pain s/p R rotator cuff repair with biceps tenotomy.  Arthroscopic incisions are cleaning and healing well.  Patient deficits include decreased shoulder mobility, decreased shoulder strength, scapular dysfunction, postural deficits, and soft tissue dysfunction.  Patient began therapeutic exercises to improve shoulder mobility and maintain wrist and elbow mobility.  Patient requires skilled PT to improve shoulder mobility and strength, decrease pain, and improve ADL performance.   Plan to progress patient as outlined in protocol and according to patient tolerance.   Understanding of Dx/Px/POC: good     Prognosis: good    Goals  STG 1  Patient will decrease shoulder pain to 2/10 at worst to improve tolerance to therapy in 4 weeks.     STG 2  Patient will report 50% improvement in overall condition in 4 weeks to display improved quality of life.    STG 3  Patient will display ROM of 90 degrees active shoulder elevation to display improved functional mobility in 4 weeks.      LTG 1 Patient will report no pain in shoulder or elbow to display improved ability to perform ADLs in 12 weeks.    LTG 2  Patient will display 5/5 RUE strength to display improved functional reaching and lifting in12 weeks.      Plan  Patient would benefit from: PT eval  Referral necessary: No  Planned modality interventions: TENS, thermotherapy: hydrocollator packs and cryotherapy    Planned therapy interventions: IASTM, joint mobilization, manual therapy, kinesiology taping, nerve gliding, patient education, postural training, strengthening, stretching, therapeutic activities, therapeutic exercise, home exercise program, functional ROM exercises, flexibility and ADL training    Frequency: 2x week  Duration in weeks: 12  Plan of Care beginning date: 10/28/2024  Plan of Care expiration date: 2025        Subjective Evaluation    History of Present Illness  Date of onset: 2024  Date of surgery: 10/8/2024  Mechanism of injury: surgery  Mechanism of injury: Patient had R shoulder pain which she was initially treated with PT.  Patient hurt her shoulder trying to pull down the glass in her storm door.  Patient had a rotator cuff repair and biceps tenotomy on 10/8.  She did her exercises initially at home using the [employer] provided by her surgeon.  She is currently to lift no weight and protocol was provided.  Patient reports she is having a lot of pain in the shoulder and radiating down into the elbow.  Patient is unable to lift the arm much actively.          Not a recurrent problem   Quality of life: fair    Patient Goals  Patient goals for therapy: decreased pain, decreased edema, increased strength, independence with ADLs/IADLs, increased motion and return to sport/leisure activities    Pain  Current pain ratin  At best pain rating: 3  At worst pain rating: 10  Quality: sharp and tight  Relieving factors: ice, rest, relaxation and support  Progression: improved    Social Support  Steps to enter house: no  Stairs in house: no   Lives in: one-story house  Lives with: alone    Employment status: not working  Hand dominance: left      Diagnostic Tests  MRI studies: abnormal  Treatments  Previous treatment: immobilization and  "physical therapy  Current treatment: physical therapy        Objective     General Comments:      Shoulder Comments   Shoulder PROM R:  FLX: 60  ABD: 60  ER: 45  IR: 10      Shoulder AROM R:  FLX: 30  ABD: 45  ER: 30  IR: 0    UE MMT:      Shoulder R           FLX: 3-           ABD: 3-           ER: 3-           IR: 3-      Formal MMT not tested                      Precautions: No lifting # with R arm      Manuals 10/28            PROM R shoulder FLX, ABD, ER, IR 10' AT                                                   Neuro Re-Ed                                                                                                        Ther Ex             Scap Ret  20x5\"            Elbow FLX/EXT 20x            Supine Shoulder FLX AAROM 10x            Wrist Circumduction 20x            Forearm Pronation 20x                                                   Ther Activity                                       Gait Training                                       Modalities                                            "

## 2024-10-28 NOTE — LETTER
2024    Tiffanie Pa PA-C  [address] [facility] [address]    Patient: Serenity Anaya   YOB: 1950   Date of Visit: 10/28/2024     Encounter Diagnosis     ICD-10-CM    1. Follow-up examination, following other surgery  Z09       2. Chronic right shoulder pain  M25.511     G89.29           Dear Dr. Pa:    Thank you for your recent referral of Serenity Anaya. Please review the attached evaluation summary from Serenity's recent visit.     Please verify that you agree with the plan of care by signing the attached order.     If you have any questions or concerns, please do not hesitate to call.     I sincerely appreciate the opportunity to share in the care of one of your patients and hope to have another opportunity to work with you in the near future.       Sincerely,    Jorge Alberto Magaña, PT      Referring Provider:      I certify that I have read the below Plan of Care and certify the need for these services furnished under this plan of treatment while under my care.                    Tiffanie Pa PA-C  [address] [facility] [address]  Via Fax: [phone]          PT Evaluation     Today's date: 10/28/2024  Patient name: Serenity Anaya  : 1950  MRN: [id number]  Referring provider: Tiffanie aP PA-C  Dx:   Encounter Diagnosis     ICD-10-CM    1. Follow-up examination, following other surgery  Z09       2. Chronic right shoulder pain  M25.511     G89.29           Start Time: 1345  Stop Time: 1430  Total time in clinic (min): 45 minutes    Assessment  Impairments: abnormal muscle firing, abnormal or restricted ROM, activity intolerance, impaired physical strength, pain with function, scapular dyskinesis, poor posture  and poor body mechanics  Symptom irritability: high    Assessment details: Patient is a 75 y/o female who presents to physical therapy with R shoulder pain s/p R rotator cuff repair with biceps tenotomy.  Arthroscopic incisions  are cleaning and healing well.  Patient deficits include decreased shoulder mobility, decreased shoulder strength, scapular dysfunction, postural deficits, and soft tissue dysfunction.  Patient began therapeutic exercises to improve shoulder mobility and maintain wrist and elbow mobility.  Patient requires skilled PT to improve shoulder mobility and strength, decrease pain, and improve ADL performance.   Plan to progress patient as outlined in protocol and according to patient tolerance.   Understanding of Dx/Px/POC: good     Prognosis: good    Goals  STG 1  Patient will decrease shoulder pain to 2/10 at worst to improve tolerance to therapy in 4 weeks.     STG 2  Patient will report 50% improvement in overall condition in 4 weeks to display improved quality of life.    STG 3  Patient will display ROM of 90 degrees active shoulder elevation to display improved functional mobility in 4 weeks.      LTG 1 Patient will report no pain in shoulder or elbow to display improved ability to perform ADLs in 12 weeks.    LTG 2  Patient will display 5/5 RUE strength to display improved functional reaching and lifting in12 weeks.     Plan  Patient would benefit from: PT eval  Referral necessary: No  Planned modality interventions: TENS, thermotherapy: hydrocollator packs and cryotherapy    Planned therapy interventions: IASTM, joint mobilization, manual therapy, kinesiology taping, nerve gliding, patient education, postural training, strengthening, stretching, therapeutic activities, therapeutic exercise, home exercise program, functional ROM exercises, flexibility and ADL training    Frequency: 2x week  Duration in weeks: 12  Plan of Care beginning date: 10/28/2024  Plan of Care expiration date: 1/20/2025        Subjective Evaluation    History of Present Illness  Date of onset: 6/1/2024  Date of surgery: 10/8/2024  Mechanism of injury: surgery  Mechanism of injury: Patient had R shoulder pain which she was initially treated  "with PT.  Patient hurt her shoulder trying to pull down the glass in her storm door.  Patient had a rotator cuff repair and biceps tenotomy on 10/8.  She did her exercises initially at home using the [employer] provided by her surgeon.  She is currently to lift no weight and protocol was provided.  Patient reports she is having a lot of pain in the shoulder and radiating down into the elbow.  Patient is unable to lift the arm much actively.          Not a recurrent problem   Quality of life: fair    Patient Goals  Patient goals for therapy: decreased pain, decreased edema, increased strength, independence with ADLs/IADLs, increased motion and return to sport/leisure activities    Pain  Current pain ratin  At best pain rating: 3  At worst pain rating: 10  Quality: sharp and tight  Relieving factors: ice, rest, relaxation and support  Progression: improved    Social Support  Steps to enter house: no  Stairs in house: no   Lives in: one-story house  Lives with: alone    Employment status: not working  Hand dominance: left      Diagnostic Tests  MRI studies: abnormal  Treatments  Previous treatment: immobilization and physical therapy  Current treatment: physical therapy        Objective     General Comments:      Shoulder Comments   Shoulder PROM R:  FLX: 60  ABD: 60  ER: 45  IR: 10      Shoulder AROM R:  FLX: 30  ABD: 45  ER: 30  IR: 0    UE MMT:      Shoulder R           FLX: 3-           ABD: 3-           ER: 3-           IR: 3-      Formal MMT not tested                      Precautions: No lifting # with R arm      Manuals 10/28            PROM R shoulder FLX, ABD, ER, IR 10' AT                                                   Neuro Re-Ed                                                                                                        Ther Ex             Scap Ret  20x5\"            Elbow FLX/EXT 20x            Supine Shoulder FLX AAROM 10x            Wrist Circumduction 20x            Forearm Pronation 20x "                                                   Ther Activity                                       Gait Training                                       Modalities

## 2024-11-01 ENCOUNTER — OFFICE VISIT (OUTPATIENT)
Dept: PHYSICAL THERAPY | Facility: CLINIC | Age: 74
End: 2024-11-01
Payer: COMMERCIAL

## 2024-11-01 DIAGNOSIS — M25.511 CHRONIC RIGHT SHOULDER PAIN: ICD-10-CM

## 2024-11-01 DIAGNOSIS — G89.29 CHRONIC RIGHT SHOULDER PAIN: ICD-10-CM

## 2024-11-01 DIAGNOSIS — Z09 FOLLOW-UP EXAMINATION, FOLLOWING OTHER SURGERY: Primary | ICD-10-CM

## 2024-11-01 PROCEDURE — 97530 THERAPEUTIC ACTIVITIES: CPT

## 2024-11-01 PROCEDURE — 97110 THERAPEUTIC EXERCISES: CPT

## 2024-11-01 PROCEDURE — 97140 MANUAL THERAPY 1/> REGIONS: CPT

## 2024-11-01 NOTE — PROGRESS NOTES
"Daily Note     Today's date: 2024  Patient name: Serenity Anaya  : 1950  MRN: [id number]  Referring provider: Tiffanie Pa PA-C  Dx:   Encounter Diagnosis     ICD-10-CM    1. Follow-up examination, following other surgery  Z09       2. Chronic right shoulder pain  M25.511     G89.29           Start Time: 1015  Stop Time: 1100  Total time in clinic (min): 45 minutes    Subjective: Patient reports she wears the sling if she goes out and when she goes to bed.  Patient reports her HEP is going well.      Objective: See treatment diary below      Assessment: Tolerated treatment well. Patient would benefit from continued PT.  Patient is at 3 weeks today and is clear for next phase of protocol.  AAROM exercises incorporated to improve movement in shoulder.  Patient is tolerating progressions well.  Will progress patient as tolerated to improve mobility of shoulder within protocol.  Pain levels are currently high and are a barrier to treatment.      Plan: Continue per plan of care.  Progress treatment as tolerated.       Precautions: No lifting # with R arm RCR 10/8      Manuals 10/28 11/1           PROM R shoulder FLX, ABD, ER, IR 10' AT 10' AT                                                  Neuro Re-Ed                                                                                                        Ther Ex             Scap Ret  20x5\" 20x5\"           Elbow FLX/EXT 20x 20x           Supine Shoulder FLX AAROM 10x 10x           Wrist Circumduction 20x 20x           Forearm Pronation 20x 20x                                                  Ther Activity             Pulleys FLX and Scap  3' ea           Prone Row  10x           Prone EXT  10x                        Gait Training                                       Modalities                                            "

## 2024-11-04 ENCOUNTER — OFFICE VISIT (OUTPATIENT)
Dept: PHYSICAL THERAPY | Facility: CLINIC | Age: 74
End: 2024-11-04
Payer: COMMERCIAL

## 2024-11-04 DIAGNOSIS — M25.511 CHRONIC RIGHT SHOULDER PAIN: Primary | ICD-10-CM

## 2024-11-04 DIAGNOSIS — Z09 FOLLOW-UP EXAMINATION, FOLLOWING OTHER SURGERY: ICD-10-CM

## 2024-11-04 DIAGNOSIS — G89.29 CHRONIC RIGHT SHOULDER PAIN: Primary | ICD-10-CM

## 2024-11-04 PROCEDURE — 97110 THERAPEUTIC EXERCISES: CPT

## 2024-11-04 PROCEDURE — 97140 MANUAL THERAPY 1/> REGIONS: CPT

## 2024-11-04 PROCEDURE — 97530 THERAPEUTIC ACTIVITIES: CPT

## 2024-11-04 NOTE — PROGRESS NOTES
Daily Note     Today's date: 8/10/2020  Patient name: Alden Gaitan  : 1950  MRN: [id number]  Referring provider: Arlette Hampton DO  Dx: No diagnosis found  Subjective: Pt reports the [facility] comfort cool did not yield good results  She states it has been feeling better since the weekend  Objective: See treatment diary below    Wrist/Hand Comments  Pt noting tingling in her thumb and IF; PT issued a SA wrist support for nighttime   and when active during the day  AROM left wrist E/F- 60/55; Thumb MP- 0/60; IP- 0/75;opposition--2 0 cm  Strength-  II- 50/60; 3 ALISSON- ; Key-   Circumference at wrist- 15 5 cm; MPs- 19 0 cm; Thumb P1-5 6 cm; P1- 5 6 cm  Special tests- (+) FCR, (-) Tinel at wrist  Pt cont to note occasional tingling in her thumb and wrist area      Assessment: Tolerated treatment well today  Pt with less symptoms during session  Supination provided discomfort around thumb with load  Plan: Progress treatment as tolerated  Continue with current program      Precautions: avoid stressful or repetitive actiivity  Manuals 7/8 7/15 7/20 7/22 7/27 7/29 8/3 8/5 8/10  7/3   STM 15 15 15 15 15 15 15 15 15  15                           HEP  CC  isotoner  SA wrist support     KR                             15   [address]  wrist/thumb            LMed           Neuro Re-Ed                      HP/pulsed biph 15 15 15 15 15 15 15 15 15  15                                                                                                                                                   Ther Ex                       Theraputty   T/Y 3'  T/Y 3'  T/Y 3'  T/Y 3'  T/Y 3' T/Y 2' T/Y 2' T 2' T/Y 2'  T/Y 2'                 5 finger    T/Y  10  T/Y /10  T/Y 2/10  T/Y 2/10  T/Y /10 T/Y /10 T/Y /10 T /10 T/Y 2'  T/Y 2'   Farooq  20  2/10  20 2/10  20 2/10  20 2/10  20 2/10  20 2/10 unable   20# /10 20 /10  20 2/10   Blue  III  /10  IV /10  IV [facility] is requesting a rx for     diclofenac (VOLTAREN) 75 MG EC tablet      Last OV 8/9/2024      Next OV Visit date not found        IV 2/10  IV 2/10  IV 2/10  unable   III 2/10 III 2/10  III 2/10   Flexbar  Y  2/10  R/Y 2/10  R/Y 2/10  R/Y  R/Y 2/10  R/Y 2/10  R/Y  2/10 Y 2/10 Y 2/10  Y 2/10   DB E/F  3  2/10  3 2/10  3 2/10  3 2/10  3 2/10  3 2/10  3  2/10 3# 2/10 3# 2/10  3 2/10   S/P  H  2/10  H 2/10  H 2/10  H 2/10  0# 2/10  0# 2/10  0  2/10  H  2/10 0# 2/10  --->    Twister    20/20  20/20  20/20  20/20  20/20  20/20  20/20  20/20                                                                                                                                                   Modalities                       US  12 12 12 12 12 12 12 12 12  12   CP 15 15 15 15 15 15 15 15 15  15

## 2024-11-04 NOTE — PROGRESS NOTES
"Daily Note     Today's date: 2024  Patient name: Serenity Anaya  : 1950  MRN: [id number]  Referring provider: Tiffanie Pa PA-C  Dx: No diagnosis found.               Subjective: Patient reports she was able to raise her arm with her AAROM exercises much higher today.  Patient reports she was surprised how much more mobile she felt this morning.      Objective: See treatment diary below      Assessment: Tolerated treatment well. Patient would benefit from continued PT.  PT performed manual therapy consisting of shoulder PROM to improve mobility and decrease pain in R shoulder.  Patient continued TherEx to improve shoulder mobility.  TherAct was continued in order to improve functional mobility and reaching.  Patient was progressed according to protocol.  Patient would benefit from skilled PT to address functional deficits and pain.       Plan: Continue per plan of care.  Progress treatment as tolerated.       Precautions: No lifting # with R arm RCR 10/8      Manuals 10/28 11/1 11/4          PROM R shoulder FLX, ABD, ER, IR 10' AT 10' AT 10' AT                                                 Neuro Re-Ed                                                                                                        Ther Ex             Scap Ret  20x5\" 20x5\" 20x 5\"          Elbow FLX/EXT 20x 20x HEP          Supine Shoulder FLX AAROM 10x 10x 10x          Wrist Circumduction 20x 20x HEP          Forearm Pronation 20x 20x HEP          Supine ER and IR with cane    20x 5\"                                    Ther Activity             Pulleys FLX and Scap  3' ea 3' ea          Prone Row  10x 20x          Prone EXT  10x 20x          Table Slides FLX and Scap   20x ea          Gait Training                                       Modalities                                              "

## 2024-11-07 ENCOUNTER — OFFICE VISIT (OUTPATIENT)
Dept: PHYSICAL THERAPY | Facility: CLINIC | Age: 74
End: 2024-11-07
Payer: COMMERCIAL

## 2024-11-07 DIAGNOSIS — M25.511 CHRONIC RIGHT SHOULDER PAIN: Primary | ICD-10-CM

## 2024-11-07 DIAGNOSIS — Z09 FOLLOW-UP EXAMINATION, FOLLOWING OTHER SURGERY: ICD-10-CM

## 2024-11-07 DIAGNOSIS — G89.29 CHRONIC RIGHT SHOULDER PAIN: Primary | ICD-10-CM

## 2024-11-07 PROCEDURE — 97530 THERAPEUTIC ACTIVITIES: CPT

## 2024-11-07 PROCEDURE — 97140 MANUAL THERAPY 1/> REGIONS: CPT

## 2024-11-07 PROCEDURE — 97110 THERAPEUTIC EXERCISES: CPT

## 2024-11-07 NOTE — PROGRESS NOTES
"Daily Note     Today's date: 2024  Patient name: Serenity Anaya  : 1950  MRN: [id number]  Referring provider: Tiffanie Pa PA-C  Dx:   Encounter Diagnosis     ICD-10-CM    1. Chronic right shoulder pain  M25.511     G89.29       2. Follow-up examination, following other surgery  Z09           Start Time: 1430  Stop Time: 1515  Total time in clinic (min): 45 minutes    Subjective: Patient reports she has been sore since last session.  Patient blames the table slide exercise for her pain.  Patient feels she was pushing too far past pain with the pulley's and table slides.      Objective: See treatment diary below      Assessment: Tolerated treatment fair. Patient would benefit from continued PT.  Patient is having continued high levels of pain which are preventing her from completing some exercises and tolerating ROM.  Patient was educated on rehab process and the exercises which she is currently completing.  Patient was instructed to make sure she is completing the exercises in a mostly pain free range to protect the healing tendons.  Will progress as tolerated.  If severe pain persists or worsens patient will be referred back to surgeon for follow up assessment.      Plan: Continue per plan of care.  Progress treatment as tolerated.   Progress treament per protocol.      Precautions: No lifting # with R arm RCR 10/8      Manuals 10/28 11/1 11/4 11/7         PROM R shoulder FLX, ABD, ER, IR 10' AT 10' AT 10' AT 10' AT                                                Neuro Re-Ed                                                                                                        Ther Ex             Scap Ret  20x5\" 20x5\" 20x 5\" 20x5\"         Elbow FLX/EXT 20x 20x HEP          Supine Shoulder FLX AAROM 10x 10x 10x 10x         Wrist Circumduction 20x 20x HEP          Forearm Pronation 20x 20x HEP          Supine ER and IR with cane    20x 5\" 20x5\"                                   Ther Activity      "        Pulleys FLX and Scap  3' ea 3' ea 3' ea         Prone Row  10x 20x 20x          Prone EXT  10x 20x 20x         Table Slides FLX and Scap   20x ea 10x ea         Gait Training                                       Modalities

## 2024-11-11 ENCOUNTER — OFFICE VISIT (OUTPATIENT)
Dept: PHYSICAL THERAPY | Facility: CLINIC | Age: 74
End: 2024-11-11
Payer: COMMERCIAL

## 2024-11-11 DIAGNOSIS — Z09 FOLLOW-UP EXAMINATION, FOLLOWING OTHER SURGERY: ICD-10-CM

## 2024-11-11 DIAGNOSIS — M25.511 CHRONIC RIGHT SHOULDER PAIN: Primary | ICD-10-CM

## 2024-11-11 DIAGNOSIS — G89.29 CHRONIC RIGHT SHOULDER PAIN: Primary | ICD-10-CM

## 2024-11-11 PROCEDURE — 97140 MANUAL THERAPY 1/> REGIONS: CPT

## 2024-11-11 PROCEDURE — 97110 THERAPEUTIC EXERCISES: CPT

## 2024-11-11 PROCEDURE — 97530 THERAPEUTIC ACTIVITIES: CPT

## 2024-11-11 NOTE — PROGRESS NOTES
"Daily Note     Today's date: 2024  Patient name: Serenity Anaya  : 1950  MRN: [id number]  Referring provider: Tiffanie Pa PA-C  Dx:   Encounter Diagnosis     ICD-10-CM    1. Chronic right shoulder pain  M25.511     G89.29       2. Follow-up examination, following other surgery  Z09           Start Time: 1430  Stop Time: 1515  Total time in clinic (min): 45 minutes    Subjective: Patient reports she was cleaning a bathroom and she is really sore from that.  Patient reports she was holding some light weight objects in her R arm.  Patient reports she is still having a lot of pain in the R arm.  Patient report 6/10 pain today.      Objective: See treatment diary below      Assessment: Tolerated treatment well. Patient would benefit from continued PT.  Patient is still limited by severe pain.  Patient is continuing to have pain with simple exercises.  Patient was able to complete all exercises on flow sheet today.  Se had variable pain with each motion mainly returning her shoulder from elevation to her side.  Patient muscle guards significantly and requires constant cueing during manual therapy and exercise.  Following STM and cueing to reduce muscle guarding patient was getting around 150 degrees elevation passively.  Will continue to progress as per protocol and tolerance.      Plan: Continue per plan of care.  Progress treatment as tolerated.   Progress treament per protocol.      Precautions: No lifting # with R arm RCR 10/8      Manuals 10/28 11/1 11/4 11/7 11/11        PROM R shoulder FLX, ABD, ER, IR 10' AT 10' AT 10' AT 10' AT   10' AT  + STM                                               Neuro Re-Ed                                                                                                        Ther Ex             Scap Ret  20x5\" 20x5\" 20x 5\" 20x5\" 20x 5\"        Elbow FLX/EXT 20x 20x HEP          Supine Shoulder FLX AAROM 10x 10x 10x 10x 10x        Wrist Circumduction 20x 20x HEP   " "       Forearm Pronation 20x 20x HEP          Supine ER and IR with cane    20x 5\" 20x5\" 20x5\"                                  Ther Activity             Pulleys FLX and Scap  3' ea 3' ea 3' ea 3' ea        Prone Row  10x 20x 20x  20x        Prone EXT  10x 20x 20x 20x        Table Slides FLX and Scap   20x ea 10x ea 10x ea         Gait Training                                       Modalities                                                  "

## 2024-11-14 ENCOUNTER — OFFICE VISIT (OUTPATIENT)
Dept: PHYSICAL THERAPY | Facility: CLINIC | Age: 74
End: 2024-11-14
Payer: COMMERCIAL

## 2024-11-14 DIAGNOSIS — M25.511 CHRONIC RIGHT SHOULDER PAIN: Primary | ICD-10-CM

## 2024-11-14 DIAGNOSIS — G89.29 CHRONIC RIGHT SHOULDER PAIN: Primary | ICD-10-CM

## 2024-11-14 DIAGNOSIS — Z09 FOLLOW-UP EXAMINATION, FOLLOWING OTHER SURGERY: ICD-10-CM

## 2024-11-14 PROCEDURE — 97530 THERAPEUTIC ACTIVITIES: CPT

## 2024-11-14 PROCEDURE — 97140 MANUAL THERAPY 1/> REGIONS: CPT

## 2024-11-14 PROCEDURE — 97110 THERAPEUTIC EXERCISES: CPT

## 2024-11-14 NOTE — PROGRESS NOTES
"Daily Note     Today's date: 2024  Patient name: Serenity Anaya  : 1950  MRN: [id number]  Referring provider: Tiffanie Pa PA-C  Dx:   Encounter Diagnosis     ICD-10-CM    1. Chronic right shoulder pain  M25.511     G89.29       2. Follow-up examination, following other surgery  Z09           Start Time: 1430  Stop Time: 1515  Total time in clinic (min): 45 minutes    Subjective: Patient states she uses the arm even though it hurts.  When questioned patient would not give clear answers as to what she uses the arm for.  Patient was educated on importance of protecting the repair at this point.      Objective: See treatment diary below      Assessment: Tolerated treatment fair. Patient would benefit from continued PT.  PT performed manual therapy consisting of PROM of R shoulder to improve mobility and decrease pain in.  Patient continued TherEx to improve shoulder mobility.  TherAct was continued in order to improve reaching and UE function.  Sidelying ER was incorporated today to improve mobility.  Patient would benefit from skilled PT to address functional deficits and pain.       Plan: Continue per plan of care.  Progress treatment as tolerated.       Precautions: No lifting # with R arm RCR 10/8      Manuals 10/28 11/1 11/4 11/7 11/11 11/14       PROM R shoulder FLX, ABD, ER, IR 10' AT 10' AT 10' AT 10' AT   10' AT  + STM 10' AT +STM                                              Neuro Re-Ed                                                                                                        Ther Ex             Scap Ret  20x5\" 20x5\" 20x 5\" 20x5\" 20x 5\" 20x5\"       Elbow FLX/EXT 20x 20x HEP          Supine Shoulder FLX AAROM 10x 10x 10x 10x 10x 10x       Wrist Circumduction 20x 20x HEP          Forearm Pronation 20x 20x HEP          Supine ER and IR with cane    20x 5\" 20x5\" 20x5\" 20x5\"       S/L ER      20x                    Ther Activity             Pulleys FLX and Scap  3' ea 3' ea 3' ea " 3' ea 3' ea       Prone Row  10x 20x 20x  20x 20x        Prone EXT  10x 20x 20x 20x 20x       Table Slides FLX and Scap   20x ea 10x ea 10x ea  10x ea        Gait Training                                       Modalities

## 2024-11-18 ENCOUNTER — OFFICE VISIT (OUTPATIENT)
Dept: PHYSICAL THERAPY | Facility: CLINIC | Age: 74
End: 2024-11-18
Payer: COMMERCIAL

## 2024-11-18 DIAGNOSIS — G89.29 CHRONIC RIGHT SHOULDER PAIN: Primary | ICD-10-CM

## 2024-11-18 DIAGNOSIS — Z09 FOLLOW-UP EXAMINATION, FOLLOWING OTHER SURGERY: ICD-10-CM

## 2024-11-18 DIAGNOSIS — M25.511 CHRONIC RIGHT SHOULDER PAIN: Primary | ICD-10-CM

## 2024-11-18 PROCEDURE — 97110 THERAPEUTIC EXERCISES: CPT

## 2024-11-18 PROCEDURE — 97140 MANUAL THERAPY 1/> REGIONS: CPT

## 2024-11-18 PROCEDURE — 97530 THERAPEUTIC ACTIVITIES: CPT

## 2024-11-18 NOTE — PROGRESS NOTES
"Daily Note     Today's date: 2024  Patient name: Serenity Anaya  : 1950  MRN: [id number]  Referring provider: Tiffanie Pa PA-C  Dx:   Encounter Diagnosis     ICD-10-CM    1. Chronic right shoulder pain  M25.511     G89.29       2. Follow-up examination, following other surgery  Z09           Start Time: 1430  Stop Time: 1515  Total time in clinic (min): 45 minutes    Subjective: Patient reports her cat slid down her affected shoulder while she was on the couch and irritated it.  She reports she had some pain sleeping with it last night and had to take medication.      Objective: See treatment diary below      Assessment: Tolerated treatment well. Patient would benefit from continued PT.  Patient is limited in progress due to high levels of pain.  Patient continues to do note pain in the axillary region and into the biceps as well as near the supraspinatus tendon.  Patient has most difficulty initiating motion.  PT does have some concerns of re injury to the repair as patient did report previously she had been doing more than she thought she should be at home.  Patient has a follow up with surgeon   and will reassess following that appointment.      Plan: Continue per plan of care.  Progress treatment as tolerated.       Precautions: No lifting # with R arm RCR 10/8      Manuals 10/28 11/1 11/4 11/7 11/11 11/14 11/18      PROM R shoulder FLX, ABD, ER, IR 10' AT 10' AT 10' AT 10' AT   10' AT  + STM 10' AT +STM 10' AT                                             Neuro Re-Ed                                                                                                        Ther Ex             Scap Ret  20x5\" 20x5\" 20x 5\" 20x5\" 20x 5\" 20x5\" 20x5\"      Elbow FLX/EXT 20x 20x HEP          Supine Shoulder FLX AAROM 10x 10x 10x 10x 10x 10x 10x       Wrist Circumduction 20x 20x HEP          Forearm Pronation 20x 20x HEP          Supine ER and IR with cane    20x 5\" 20x5\" 20x5\" 20x5\" 20x 5\"    "   S/L ER      20x 20x                   Ther Activity             Pulleys FLX and Scap  3' ea 3' ea 3' ea 3' ea 3' ea 3' ea      Prone Row  10x 20x 20x  20x 20x  20x      Prone EXT  10x 20x 20x 20x 20x 20x      Table Slides FLX and Scap   20x ea 10x ea 10x ea  10x ea  10x ea      Gait Training                                       Modalities

## 2024-11-21 ENCOUNTER — OFFICE VISIT (OUTPATIENT)
Dept: PHYSICAL THERAPY | Facility: CLINIC | Age: 74
End: 2024-11-21
Payer: COMMERCIAL

## 2024-11-21 DIAGNOSIS — M25.511 CHRONIC RIGHT SHOULDER PAIN: Primary | ICD-10-CM

## 2024-11-21 DIAGNOSIS — Z09 FOLLOW-UP EXAMINATION, FOLLOWING OTHER SURGERY: ICD-10-CM

## 2024-11-21 DIAGNOSIS — G89.29 CHRONIC RIGHT SHOULDER PAIN: Primary | ICD-10-CM

## 2024-11-21 PROCEDURE — 97140 MANUAL THERAPY 1/> REGIONS: CPT

## 2024-11-21 PROCEDURE — 97110 THERAPEUTIC EXERCISES: CPT

## 2024-11-21 PROCEDURE — 97530 THERAPEUTIC ACTIVITIES: CPT

## 2024-11-21 NOTE — PROGRESS NOTES
"Daily Note     Today's date: 2024  Patient name: Serenity Anaya  : 1950  MRN: [id number]  Referring provider: Tiffanie Pa PA-C  Dx:   Encounter Diagnosis     ICD-10-CM    1. Chronic right shoulder pain  M25.511     G89.29       2. Follow-up examination, following other surgery  Z09           Start Time: 1430  Stop Time: 1515  Total time in clinic (min): 45 minutes    Subjective: Patient reports she was at the chiropractor the other day and she felt better after he performed soft tissue work on the shoulder.  Patient reports today she is more sore than normal.  Patient has some bruising over the area of the supraspinatus which may be from the soft tissue work.      Objective: See treatment diary below      Assessment: Tolerated treatment fair. Patient would benefit from continued PT.  Patient has surpassed the 6 week rachell and is set to move to the next phase of her protocol, however, patient is progressing very slowly.  Patient has very low tolerance to exercises and increased pain with them.  PT does have some concerns about integrity of repair based on areas of pain and limitations.  Patient has a follow up on Monday.        Plan: Continue per plan of care.  Progress treatment as tolerated.   Progress treament per protocol.      Precautions: No lifting # with R arm RCR 10/8      Manuals 10/28 11/1 11/4 11/7 11/11 11/14 11/18 11/21     PROM R shoulder FLX, ABD, ER, IR 10' AT 10' AT 10' AT 10' AT   10' AT  + STM 10' AT +STM 10' AT 10' AT                                            Neuro Re-Ed                                                                                                        Ther Ex             Scap Ret  20x5\" 20x5\" 20x 5\" 20x5\" 20x 5\" 20x5\" 20x5\" HEP     Elbow FLX/EXT 20x 20x HEP          Supine Shoulder FLX AAROM 10x 10x 10x 10x 10x 10x 10x  10x      Wrist Circumduction 20x 20x HEP          Forearm Pronation 20x 20x HEP          Supine ER and IR with cane    20x 5\" 20x5\" " "20x5\" 20x5\" 20x 5\" 20x5\"     S/L ER      20x 20x 20x      Standing ABD        10x     Ther Activity             Pulleys FLX and Scap  3' ea 3' ea 3' ea 3' ea 3' ea 3' ea 3' ea     Prone Row  10x 20x 20x  20x 20x  20x      Prone EXT  10x 20x 20x 20x 20x 20x      Table Slides FLX and Scap   20x ea 10x ea 10x ea  10x ea  10x ea 10x ea     Wall Slides        10x 5\"                  Gait Training                                       Modalities                                                        "

## 2024-11-27 ENCOUNTER — OFFICE VISIT (OUTPATIENT)
Dept: PHYSICAL THERAPY | Facility: CLINIC | Age: 74
End: 2024-11-27
Payer: COMMERCIAL

## 2024-11-27 DIAGNOSIS — M25.511 CHRONIC RIGHT SHOULDER PAIN: Primary | ICD-10-CM

## 2024-11-27 DIAGNOSIS — Z09 FOLLOW-UP EXAMINATION, FOLLOWING OTHER SURGERY: ICD-10-CM

## 2024-11-27 DIAGNOSIS — G89.29 CHRONIC RIGHT SHOULDER PAIN: Primary | ICD-10-CM

## 2024-11-27 PROCEDURE — 97140 MANUAL THERAPY 1/> REGIONS: CPT

## 2024-11-27 PROCEDURE — 97530 THERAPEUTIC ACTIVITIES: CPT

## 2024-11-27 PROCEDURE — 97110 THERAPEUTIC EXERCISES: CPT

## 2024-11-27 NOTE — PROGRESS NOTES
"Daily Note     Today's date: 2024  Patient name: Serenity Anaya  : 1950  MRN: [id number]  Referring provider: Tiffanie Pa PA-C  Dx:   Encounter Diagnosis     ICD-10-CM    1. Chronic right shoulder pain  M25.511     G89.29       2. Follow-up examination, following other surgery  Z09           Start Time: 1400  Stop Time: 1445  Total time in clinic (min): 45 minutes    Subjective: Patient reports her shoulder is sore today.  She reports it feels like a burning pain.      Objective: See treatment diary below      Assessment: Tolerated treatment poor. Patient would benefit from continued PT.  Patient is displaying limited AROM and limited ability to start movements.  Patient's pain levels remain very high and patient has poor tolerance to manual therapy.  Patient sees her surgeon this coming Monday.  PT has concerns regarding integrity of repair.  Patient did report in previous sessions not adhering to precautions.  Will progress as appropriate following visit with surgeon.      Plan: Continue per plan of care.  Progress treatment as tolerated.       Precautions: No lifting # with R arm RCR 10/8      Manuals 10/28 11/1 11/4 11/7 11/11 11/14 11/18 11/21 11/27    PROM R shoulder FLX, ABD, ER, IR 10' AT 10' AT 10' AT 10' AT   10' AT  + STM 10' AT +STM 10' AT 10' AT 10' AT                                           Neuro Re-Ed                                                                                                        Ther Ex             Scap Ret  20x5\" 20x5\" 20x 5\" 20x5\" 20x 5\" 20x5\" 20x5\" HEP     Elbow FLX/EXT 20x 20x HEP          Supine Shoulder FLX AAROM 10x 10x 10x 10x 10x 10x 10x  10x  10x    Wrist Circumduction 20x 20x HEP          Forearm Pronation 20x 20x HEP          Supine ER and IR with cane    20x 5\" 20x5\" 20x5\" 20x5\" 20x 5\" 20x5\" 20x5\"    S/L ER      20x 20x 20x  20x    Standing ABD        10x 10x    Ther Activity             Pulleys FLX and Scap  3' ea 3' ea 3' ea 3' ea 3' ea " "3' ea 3' ea 3' ea    Prone Row  10x 20x 20x  20x 20x  20x 20x NT    Prone EXT  10x 20x 20x 20x 20x 20x 20x NT    Table Slides FLX and Scap   20x ea 10x ea 10x ea  10x ea  10x ea 10x ea 10x ea    Wall Slides        10x 5\" 10x5\"                 Gait Training                                       Modalities                                                          "

## 2024-12-05 ENCOUNTER — OFFICE VISIT (OUTPATIENT)
Dept: PHYSICAL THERAPY | Facility: CLINIC | Age: 74
End: 2024-12-05
Payer: COMMERCIAL

## 2024-12-05 DIAGNOSIS — M25.511 CHRONIC RIGHT SHOULDER PAIN: Primary | ICD-10-CM

## 2024-12-05 DIAGNOSIS — Z09 FOLLOW-UP EXAMINATION, FOLLOWING OTHER SURGERY: ICD-10-CM

## 2024-12-05 DIAGNOSIS — G89.29 CHRONIC RIGHT SHOULDER PAIN: Primary | ICD-10-CM

## 2024-12-05 PROCEDURE — 97140 MANUAL THERAPY 1/> REGIONS: CPT

## 2024-12-05 PROCEDURE — 97110 THERAPEUTIC EXERCISES: CPT

## 2024-12-05 PROCEDURE — 97530 THERAPEUTIC ACTIVITIES: CPT

## 2024-12-05 NOTE — LETTER
2024    No Recipients    Patient: Serenity Anaya   YOB: 1950   Date of Visit: 2024     Encounter Diagnosis     ICD-10-CM    1. Chronic right shoulder pain  M25.511     G89.29       2. Follow-up examination, following other surgery  Z09           Dear Dr. Spangler Recipients:    Thank you for your recent referral of Serenity Anaya. Please review the attached evaluation summary from Serenity's recent visit.     Please verify that you agree with the plan of care by signing the attached order.     If you have any questions or concerns, please do not hesitate to call.     I sincerely appreciate the opportunity to share in the care of one of your patients and hope to have another opportunity to work with you in the near future.       Sincerely,    Jorge Alberto Magaña, PT      Referring Provider:      I certify that I have read the below Plan of Care and certify the need for these services furnished under this plan of treatment while under my care.                    No Recipients          PT Evaluation     Today's date: 2024  Patient name: Serenity Anaya  : 1950  MRN: [id number]  Referring provider: Tiffanie Pa PA-C  Dx:   No diagnosis found.                 Assessment  Impairments: abnormal muscle firing, abnormal or restricted ROM, activity intolerance, impaired physical strength, pain with function, scapular dyskinesis, poor posture  and poor body mechanics  Symptom irritability: high    Assessment details: Patient is a 73 y/o female who presents to physical therapy with R shoulder pain s/p R rotator cuff repair with biceps tenotomy.  Patient is making slow progress due to pain levels and size of tear.  Patient saw her surgeon and he was pleased with progress so far.  He assessed repair and found everything to be intact.  Pain levels are biggest barrier to treatment.  ROM has progressed well and patient is progressing toward rehab goals.  Will continue to progress  as outlined in plan of care and according to protocol.  Understanding of Dx/Px/POC: good     Prognosis: good    Goals  STG 1  Patient will decrease shoulder pain to 2/10 at worst to improve tolerance to therapy in 4 weeks. Not met    STG 2  Patient will report 50% improvement in overall condition in 4 weeks to display improved quality of life. MET    STG 3  Patient will display ROM of 90 degrees active shoulder elevation to display improved functional mobility in 4 weeks. MET      LTG 1 Patient will report no pain in shoulder or elbow to display improved ability to perform ADLs in 12 weeks.    LTG 2  Patient will display 5/5 RUE strength to display improved functional reaching and lifting in12 weeks.     Plan  Patient would benefit from: PT eval  Referral necessary: No  Planned modality interventions: TENS, thermotherapy: hydrocollator packs and cryotherapy    Planned therapy interventions: IASTM, joint mobilization, manual therapy, kinesiology taping, nerve gliding, patient education, postural training, strengthening, stretching, therapeutic activities, therapeutic exercise, home exercise program, functional ROM exercises, flexibility and ADL training    Frequency: 2x week  Duration in weeks: 12  Plan of Care beginning date: 10/28/2024  Plan of Care expiration date: 1/20/2025        Subjective Evaluation    History of Present Illness  Date of onset: 6/1/2024  Date of surgery: 10/8/2024  Mechanism of injury: surgery  Mechanism of injury: Patient had R shoulder pain which she was initially treated with PT.  Patient hurt her shoulder trying to pull down the glass in her storm door.  Patient had a rotator cuff repair and biceps tenotomy on 10/8.  She did her exercises initially at home using the [employer] agata provided by her surgeon.  She is currently to lift no weight and protocol was provided.  Patient reports she is having a lot of pain in the shoulder and radiating down into the elbow.  Patient is unable to lift the  "arm much actively.    Update :  Patient reports 55% improvement overall since beginning therapy.  Patient still reports high levels of pain.  Patient reports a lot of difficulty reaching behind back and still has a lot of difficulty using that arm for ADLs.            Not a recurrent problem   Quality of life: fair    Patient Goals  Patient goals for therapy: decreased pain, decreased edema, increased strength, independence with ADLs/IADLs, increased motion and return to sport/leisure activities    Pain  Current pain ratin  At best pain ratin  At worst pain ratin  Quality: dull ache and burning  Relieving factors: ice, rest, relaxation and support  Progression: improved    Social Support  Steps to enter house: no  Stairs in house: no   Lives in: one-story house  Lives with: alone    Employment status: not working  Hand dominance: left      Diagnostic Tests  MRI studies: abnormal  Treatments  Previous treatment: immobilization and physical therapy  Current treatment: physical therapy        Objective     General Comments:      Shoulder Comments   Shoulder PROM R:  FLX: 150  ABD: 150  ER: 80  IR: 45      Shoulder AROM R:  FLX: 105  ABD: 85  ER: 50  IR: 40    UE MMT:      Shoulder R           FLX: 3-           ABD: 3-           ER: 3-           IR: 3-      Formal MMT not tested                        Precautions: No lifting # with R arm RCR 10/8      Manuals 10/28 11/1 11/4 11/7 11/11 11/14 11/18 11/21 11/27 12/5 RE   PROM R shoulder FLX, ABD, ER, IR 10' AT 10' AT 10' AT 10' AT   10' AT  + STM 10' AT +STM 10' AT 10' AT 10' AT 10' AT                                          Neuro Re-Ed                                                                                                        Ther Ex             Scap Ret  20x5\" 20x5\" 20x 5\" 20x5\" 20x 5\" 20x5\" 20x5\" HEP     Elbow FLX/EXT 20x 20x HEP          Supine Shoulder FLX AAROM 10x 10x 10x 10x 10x 10x 10x  10x  10x 10x 2# weight on stick   Wrist " "Circumduction 20x 20x HEP          Forearm Pronation 20x 20x HEP          Supine ER and IR with cane    20x 5\" 20x5\" 20x5\" 20x5\" 20x 5\" 20x5\" 20x5\" D/C   S/L ER      20x 20x 20x  20x 20x   Standing ABD        10x 10x 10x + scaption   Supine Serratus Punch          10x   Supine ER/IR at 90          10x 5\"   Ther Activity             Pulleys FLX and Scap  3' ea 3' ea 3' ea 3' ea 3' ea 3' ea 3' ea 3' ea 3' ea   Prone Row  10x 20x 20x  20x 20x  20x 20x NT    Prone EXT  10x 20x 20x 20x 20x 20x 20x NT    Table Slides FLX and Scap   20x ea 10x ea 10x ea  10x ea  10x ea 10x ea 10x ea D/C   Wall Slides        10x 5\" 10x5\" 10x5\"   Tband row and EXT          10x L2 ea   Gait Training                                       Modalities                                                                            "

## 2024-12-05 NOTE — PROGRESS NOTES
PT Evaluation     Today's date: 2024  Patient name: Serenity Anaya  : 1950  MRN: [id number]  Referring provider: Tiffanie Pa PA-C  Dx:   No diagnosis found.                 Assessment  Impairments: abnormal muscle firing, abnormal or restricted ROM, activity intolerance, impaired physical strength, pain with function, scapular dyskinesis, poor posture  and poor body mechanics  Symptom irritability: high    Assessment details: Patient is a 73 y/o female who presents to physical therapy with R shoulder pain s/p R rotator cuff repair with biceps tenotomy.  Patient is making slow progress due to pain levels and size of tear.  Patient saw her surgeon and he was pleased with progress so far.  He assessed repair and found everything to be intact.  Pain levels are biggest barrier to treatment.  ROM has progressed well and patient is progressing toward rehab goals.  Will continue to progress as outlined in plan of care and according to protocol.  Understanding of Dx/Px/POC: good     Prognosis: good    Goals  STG 1  Patient will decrease shoulder pain to 2/10 at worst to improve tolerance to therapy in 4 weeks. Not met    STG 2  Patient will report 50% improvement in overall condition in 4 weeks to display improved quality of life. MET    STG 3  Patient will display ROM of 90 degrees active shoulder elevation to display improved functional mobility in 4 weeks. MET      LTG 1 Patient will report no pain in shoulder or elbow to display improved ability to perform ADLs in 12 weeks.    LTG 2  Patient will display 5/5 RUE strength to display improved functional reaching and lifting in12 weeks.     Plan  Patient would benefit from: PT eval  Referral necessary: No  Planned modality interventions: TENS, thermotherapy: hydrocollator packs and cryotherapy    Planned therapy interventions: IASTM, joint mobilization, manual therapy, kinesiology taping, nerve gliding, patient education, postural training,  strengthening, stretching, therapeutic activities, therapeutic exercise, home exercise program, functional ROM exercises, flexibility and ADL training    Frequency: 2x week  Duration in weeks: 12  Plan of Care beginning date: 10/28/2024  Plan of Care expiration date: 2025        Subjective Evaluation    History of Present Illness  Date of onset: 2024  Date of surgery: 10/8/2024  Mechanism of injury: surgery  Mechanism of injury: Patient had R shoulder pain which she was initially treated with PT.  Patient hurt her shoulder trying to pull down the glass in her storm door.  Patient had a rotator cuff repair and biceps tenotomy on 10/8.  She did her exercises initially at home using the [employer] agata provided by her surgeon.  She is currently to lift no weight and protocol was provided.  Patient reports she is having a lot of pain in the shoulder and radiating down into the elbow.  Patient is unable to lift the arm much actively.    Update :  Patient reports 55% improvement overall since beginning therapy.  Patient still reports high levels of pain.  Patient reports a lot of difficulty reaching behind back and still has a lot of difficulty using that arm for ADLs.            Not a recurrent problem   Quality of life: fair    Patient Goals  Patient goals for therapy: decreased pain, decreased edema, increased strength, independence with ADLs/IADLs, increased motion and return to sport/leisure activities    Pain  Current pain ratin  At best pain ratin  At worst pain ratin  Quality: dull ache and burning  Relieving factors: ice, rest, relaxation and support  Progression: improved    Social Support  Steps to enter house: no  Stairs in house: no   Lives in: one-story house  Lives with: alone    Employment status: not working  Hand dominance: left      Diagnostic Tests  MRI studies: abnormal  Treatments  Previous treatment: immobilization and physical therapy  Current treatment: physical  "therapy        Objective     General Comments:      Shoulder Comments   Shoulder PROM R:  FLX: 150  ABD: 150  ER: 80  IR: 45      Shoulder AROM R:  FLX: 105  ABD: 85  ER: 50  IR: 40    UE MMT:      Shoulder R           FLX: 3-           ABD: 3-           ER: 3-           IR: 3-      Formal MMT not tested                        Precautions: No lifting # with R arm RCR 10/8      Manuals 10/28 11/1 11/4 11/7 11/11 11/14 11/18 11/21 11/27 12/5 RE   PROM R shoulder FLX, ABD, ER, IR 10' AT 10' AT 10' AT 10' AT   10' AT  + STM 10' AT +STM 10' AT 10' AT 10' AT 10' AT                                          Neuro Re-Ed                                                                                                        Ther Ex             Scap Ret  20x5\" 20x5\" 20x 5\" 20x5\" 20x 5\" 20x5\" 20x5\" HEP     Elbow FLX/EXT 20x 20x HEP          Supine Shoulder FLX AAROM 10x 10x 10x 10x 10x 10x 10x  10x  10x 10x 2# weight on stick   Wrist Circumduction 20x 20x HEP          Forearm Pronation 20x 20x HEP          Supine ER and IR with cane    20x 5\" 20x5\" 20x5\" 20x5\" 20x 5\" 20x5\" 20x5\" D/C   S/L ER      20x 20x 20x  20x 20x   Standing ABD        10x 10x 10x + scaption   Supine Serratus Punch          10x   Supine ER/IR at 90          10x 5\"   Ther Activity             Pulleys FLX and Scap  3' ea 3' ea 3' ea 3' ea 3' ea 3' ea 3' ea 3' ea 3' ea   Prone Row  10x 20x 20x  20x 20x  20x 20x NT    Prone EXT  10x 20x 20x 20x 20x 20x 20x NT    Table Slides FLX and Scap   20x ea 10x ea 10x ea  10x ea  10x ea 10x ea 10x ea D/C   Wall Slides        10x 5\" 10x5\" 10x5\"   Tband row and EXT          10x L2 ea   Gait Training                                       Modalities                                                            "

## 2024-12-09 ENCOUNTER — OFFICE VISIT (OUTPATIENT)
Dept: PHYSICAL THERAPY | Facility: CLINIC | Age: 74
End: 2024-12-09
Payer: COMMERCIAL

## 2024-12-09 DIAGNOSIS — Z09 FOLLOW-UP EXAMINATION, FOLLOWING OTHER SURGERY: ICD-10-CM

## 2024-12-09 DIAGNOSIS — M25.511 CHRONIC RIGHT SHOULDER PAIN: Primary | ICD-10-CM

## 2024-12-09 DIAGNOSIS — G89.29 CHRONIC RIGHT SHOULDER PAIN: Primary | ICD-10-CM

## 2024-12-09 PROCEDURE — 97140 MANUAL THERAPY 1/> REGIONS: CPT

## 2024-12-09 PROCEDURE — 97110 THERAPEUTIC EXERCISES: CPT

## 2024-12-09 PROCEDURE — 97530 THERAPEUTIC ACTIVITIES: CPT

## 2024-12-09 NOTE — PROGRESS NOTES
"Daily Note     Today's date: 2024  Patient name: Serenity Anaya  : 1950  MRN: [id number]  Referring provider: Tiffanie Pa PA-C  Dx:   Encounter Diagnosis     ICD-10-CM    1. Chronic right shoulder pain  M25.511     G89.29       2. Follow-up examination, following other surgery  Z09           Start Time: 1430  Stop Time: 1530  Total time in clinic (min): 60 minutes    Subjective: Patient reports her shoulder is sore today.  She reports she was picking up children last night at a program and she was feeling it afterwards.  She notes she tried to use mostly her L arm.      Objective: See treatment diary below      Assessment: Tolerated treatment fair. Patient would benefit from continued PT.  Patient was educated on importance of being cautious with R arm.  Patient is not cleared for heavy lifting.  Patient is continuing to have significant difficulty with AROM exercises.  PROM has improved significantly and patient is tolerating manual therapy with greater ease.  Plan to progress patient as tolerated as per protocol.      Plan: Continue per plan of care.  Progress treatment as tolerated.       Precautions: No lifting # with R arm RCR 10/8      Manuals  RE   PROM R shoulder FLX, ABD, ER, IR 10' AT 10' AT 10' AT 10' AT   10' AT  + STM 10' AT +STM 10' AT 10' AT 10' AT 10' AT                                          Neuro Re-Ed                                                                                                        Ther Ex             Scap Ret   20x5\" 20x 5\" 20x5\" 20x 5\" 20x5\" 20x5\" HEP     Elbow FLX/EXT  20x HEP          Supine Shoulder FLX AAROM 10x  Patient requested no weight 10x 10x 10x 10x 10x 10x  10x  10x 10x 2# weight on stick   Wrist Circumduction  20x HEP          Forearm Pronation  20x HEP          Supine ER and IR with cane    20x 5\" 20x5\" 20x5\" 20x5\" 20x 5\" 20x5\" 20x5\" D/C   S/L ER 20x     20x 20x 20x  20x 20x " "  Standing ABD 15x + scaption       10x 10x 10x + scaption   Supine Serratus Punch 10x         10x   Supine ER/IR at 90 10x5\"         10x 5\"   Ther Activity             Pulleys FLX and Scap 3' ea 3' ea 3' ea 3' ea 3' ea 3' ea 3' ea 3' ea 3' ea 3' ea   Prone Row  10x 20x 20x  20x 20x  20x 20x NT    Prone EXT  10x 20x 20x 20x 20x 20x 20x NT    Table Slides FLX and Scap   20x ea 10x ea 10x ea  10x ea  10x ea 10x ea 10x ea D/C   Wall Slides 10x5\"       10x 5\" 10x5\" 10x5\"   Tband row and EXT 10x L2 ea         10x L2 ea   Tband ER/IR 10x L1             Gait Training                                       Modalities                                                              "

## 2024-12-12 ENCOUNTER — OFFICE VISIT (OUTPATIENT)
Dept: PHYSICAL THERAPY | Facility: CLINIC | Age: 74
End: 2024-12-12
Payer: COMMERCIAL

## 2024-12-12 DIAGNOSIS — M25.511 CHRONIC RIGHT SHOULDER PAIN: Primary | ICD-10-CM

## 2024-12-12 DIAGNOSIS — Z09 FOLLOW-UP EXAMINATION, FOLLOWING OTHER SURGERY: ICD-10-CM

## 2024-12-12 DIAGNOSIS — G89.29 CHRONIC RIGHT SHOULDER PAIN: Primary | ICD-10-CM

## 2024-12-12 PROCEDURE — 97110 THERAPEUTIC EXERCISES: CPT

## 2024-12-12 PROCEDURE — 97140 MANUAL THERAPY 1/> REGIONS: CPT

## 2024-12-12 PROCEDURE — 97530 THERAPEUTIC ACTIVITIES: CPT

## 2024-12-12 NOTE — PROGRESS NOTES
"Daily Note     Today's date: 2024  Patient name: Serenity Anaya  : 1950  MRN: [id number]  Referring provider: Tiffanie Pa PA-C  Dx:   Encounter Diagnosis     ICD-10-CM    1. Chronic right shoulder pain  M25.511     G89.29       2. Follow-up examination, following other surgery  Z09           Start Time: 1430  Stop Time: 1520  Total time in clinic (min): 50 minutes    Subjective: Patient reports she has a headache today.  Patient reports her shoulder is sore from \"being attacked by\"  two dogs she was with earlier.      Objective: See treatment diary below      Assessment: Tolerated treatment well. Patient would benefit from continued PT.  PT performed manual therapy consisting of PROM and scapular STM to improve mobility and decrease pain.  Patient continued TherEx to improve shoulder mobility and strength. TherAct was continued in order to improve reaching and overhead mobility.  Tband was increased in resistance.  When performing table exercises patient reported 8/10 pain.  She feels it was from the neck as well as the dogs.  Patient would benefit from skilled PT to address functional deficits and pain.       Plan: Continue per plan of care.  Progress treatment as tolerated.       Precautions: No lifting # with R arm RCR 10/8      Manuals  RE   PROM R shoulder FLX, ABD, ER, IR 10' AT 10' AT 10' AT 10' AT   10' AT  + STM 10' AT +STM 10' AT 10' AT 10' AT 10' AT                                          Neuro Re-Ed                                                                                                        Ther Ex             Scap Ret    20x 5\" 20x5\" 20x 5\" 20x5\" 20x5\" HEP     Elbow FLX/EXT   HEP          Supine Shoulder FLX AAROM 10x  Patient requested no weight 10x Patient requested no weight 10x 10x 10x 10x 10x  10x  10x 10x 2# weight on stick   Wrist Circumduction   HEP          Forearm Pronation   HEP          Supine ER and " "IR with cane    20x 5\" 20x5\" 20x5\" 20x5\" 20x 5\" 20x5\" 20x5\" D/C   S/L ER 20x 20x    20x 20x 20x  20x 20x   Standing ABD 15x + scaption 15x + scaption      10x 10x 10x + scaption   Supine Serratus Punch 10x 10x        10x   Supine ER/IR at 90 10x5\" 10x5\"        10x 5\"   Ther Activity             Pulleys FLX and Scap 3' ea 3' ea 3' ea 3' ea 3' ea 3' ea 3' ea 3' ea 3' ea 3' ea   Prone Row   20x 20x  20x 20x  20x 20x NT    Prone EXT   20x 20x 20x 20x 20x 20x NT    Table Slides FLX and Scap   20x ea 10x ea 10x ea  10x ea  10x ea 10x ea 10x ea D/C   Wall Slides 10x5\" 10x5\"      10x 5\" 10x5\" 10x5\"   Tband row and EXT 10x L2 ea 10x L2 ea        10x L2 ea   Tband ER/IR 10x L1  10x L2           Gait Training                                       Modalities                                                                "

## 2024-12-16 ENCOUNTER — APPOINTMENT (OUTPATIENT)
Dept: PHYSICAL THERAPY | Facility: CLINIC | Age: 74
End: 2024-12-16
Payer: COMMERCIAL

## 2024-12-17 DIAGNOSIS — S09.90XA UNSPECIFIED INJURY OF HEAD, INITIAL ENCOUNTER: ICD-10-CM

## 2024-12-17 DIAGNOSIS — M54.12 RADICULOPATHY, CERVICAL REGION: ICD-10-CM

## 2024-12-17 DIAGNOSIS — S19.9XXA UNSPECIFIED INJURY OF NECK, INITIAL ENCOUNTER: ICD-10-CM

## 2024-12-17 DIAGNOSIS — M50.30 OTHER CERVICAL DISC DEGENERATION, UNSPECIFIED CERVICAL REGION: ICD-10-CM

## 2024-12-17 DIAGNOSIS — M50.20 OTHER CERVICAL DISC DISPLACEMENT, UNSPECIFIED CERVICAL REGION: ICD-10-CM

## 2024-12-19 ENCOUNTER — OFFICE VISIT (OUTPATIENT)
Dept: PHYSICAL THERAPY | Facility: CLINIC | Age: 74
End: 2024-12-19
Payer: COMMERCIAL

## 2024-12-19 DIAGNOSIS — G89.29 CHRONIC RIGHT SHOULDER PAIN: Primary | ICD-10-CM

## 2024-12-19 DIAGNOSIS — M25.511 CHRONIC RIGHT SHOULDER PAIN: Primary | ICD-10-CM

## 2024-12-19 DIAGNOSIS — Z09 FOLLOW-UP EXAMINATION, FOLLOWING OTHER SURGERY: ICD-10-CM

## 2024-12-19 PROCEDURE — 97530 THERAPEUTIC ACTIVITIES: CPT

## 2024-12-19 PROCEDURE — 97110 THERAPEUTIC EXERCISES: CPT

## 2024-12-19 PROCEDURE — 97140 MANUAL THERAPY 1/> REGIONS: CPT

## 2024-12-19 NOTE — PROGRESS NOTES
"Daily Note     Today's date: 2024  Patient name: Serenity Anaya  : 1950  MRN: [id number]  Referring provider: Tiffanie Pa PA-C  Dx:   Encounter Diagnosis     ICD-10-CM    1. Chronic right shoulder pain  M25.511     G89.29       2. Follow-up examination, following other surgery  Z09           Start Time: 1430  Stop Time: 1530  Total time in clinic (min): 60 minutes    Subjective: Patient reports she has a lot of pain in the shoulder today going down into the upper arm.  Patient discussed an MRI of the neck with her doctor.  She will most likely need PT for the neck prior.        Objective: See treatment diary below      Assessment: Tolerated treatment fair. Patient would benefit from continued PT.  Progressions are limited by high levels of pain.  Patient is tolerating manual therapy better today.  Patient was unable to perform supine FLX with any weight today and was in significant pain by the end of session.  Patient continues to overdo it with the shoulder at home stating she has to push through the pain.  Patient has been educated countless times on importance of proper healing and progression back into activity.       Plan: Continue per plan of care.  Progress treatment as tolerated.       Precautions: No lifting # with R arm RCR 10/8      Manuals  RE   PROM R shoulder FLX, ABD, ER, IR 10' AT 10' AT 10' AT 10' AT   10' AT  + STM 10' AT +STM 10' AT 10' AT 10' AT 10' AT                                          Neuro Re-Ed                                                                                                        Ther Ex             Scap Ret     20x5\" 20x 5\" 20x5\" 20x5\" HEP     IR stretch w/ strap   10x5\"          Elbow FLX/EXT             Supine Shoulder FLX AAROM 10x  Patient requested no weight 10x Patient requested no weight NT too much pain 10x 10x 10x 10x  10x  10x 10x 2# weight on stick   Wrist Circumduction           " "  Forearm Pronation             Supine ER and IR with cane     20x5\" 20x5\" 20x5\" 20x 5\" 20x5\" 20x5\" D/C   S/L ER 20x 20x 20x   20x 20x 20x  20x 20x   Standing ABD 15x + scaption 15x + scaption 15x + scaption     10x 10x 10x + scaption   Supine Serratus Punch 10x 10x 10x       10x   Supine ER/IR at 90 10x5\" 10x5\" D/C       10x 5\"   Ther Activity             Pulleys FLX and Scap 3' ea 3' ea 3' ea 3' ea 3' ea 3' ea 3' ea 3' ea 3' ea 3' ea   Prone Row    20x  20x 20x  20x 20x NT    Prone EXT    20x 20x 20x 20x 20x NT    Table Slides FLX and Scap    10x ea 10x ea  10x ea  10x ea 10x ea 10x ea D/C   Wall Slides 10x5\" 10x5\" 10x5\"     10x 5\" 10x5\" 10x5\"   Tband row and EXT 10x L2 ea 10x L2 ea 20xL2 ea       10x L2 ea   Tband ER/IR 10x L1  10x L2 20x L2          Gait Training                                       Modalities                                                                  "

## 2024-12-23 ENCOUNTER — OFFICE VISIT (OUTPATIENT)
Dept: PHYSICAL THERAPY | Facility: CLINIC | Age: 74
End: 2024-12-23
Payer: COMMERCIAL

## 2024-12-23 DIAGNOSIS — G89.29 CHRONIC RIGHT SHOULDER PAIN: ICD-10-CM

## 2024-12-23 DIAGNOSIS — M25.511 CHRONIC RIGHT SHOULDER PAIN: ICD-10-CM

## 2024-12-23 DIAGNOSIS — Z09 FOLLOW-UP EXAMINATION, FOLLOWING OTHER SURGERY: Primary | ICD-10-CM

## 2024-12-23 PROCEDURE — 97530 THERAPEUTIC ACTIVITIES: CPT

## 2024-12-23 PROCEDURE — 97140 MANUAL THERAPY 1/> REGIONS: CPT

## 2024-12-23 NOTE — PROGRESS NOTES
"Daily Note     Today's date: 2024  Patient name: Serenity Anaya  : 1950  MRN: [id number]  Referring provider: Tiffanie Pa PA-C  Dx:   Encounter Diagnosis     ICD-10-CM    1. Follow-up examination, following other surgery  Z09       2. Chronic right shoulder pain  M25.511     G89.29           Start Time: 1500  Stop Time: 1530  Total time in clinic (min): 30 minutes    Subjective: Patient reports her arm is feeling alright today.  No new complaints noted.      Objective: See treatment diary below      Assessment: Tolerated treatment poor.  Patient continues to have significant pain which limits rehab. Patient would benefit from continued PT.  PT performed manual therapy consisting of shoulder PROM and STM to improve mobility and decrease pain in R shoulder.  Patient continued TherEx to improve shoulder mobility and strength.  TherAct was continued in order to improve reaching and lifting.  Some exercises were held today due to time and patient being significantly late.  Will continue to progress as tolerated and as according to protocol.  Patient would benefit from skilled PT to address functional deficits and pain.       Plan: Continue per plan of care.  Progress treatment as tolerated.       Precautions: No lifting # with R arm RCR 10/8      Manuals  RE   PROM R shoulder FLX, ABD, ER, IR 10' AT 10' AT 10' AT 10' AT   10' AT  + STM 10' AT +STM 10' AT 10' AT 10' AT 10' AT                                          Neuro Re-Ed                                                                                                        Ther Ex             Scap Ret      20x 5\" 20x5\" 20x5\" HEP     IR stretch w/ strap   10x5\" 10x5\"         Elbow FLX/EXT             Supine Shoulder FLX AAROM 10x  Patient requested no weight 10x Patient requested no weight NT too much pain 10x no weight + BP 10x 10x 10x  10x  10x 10x 2# weight on stick   Wrist " "Circumduction             Forearm Pronation             Supine ER and IR with cane      20x5\" 20x5\" 20x 5\" 20x5\" 20x5\" D/C   S/L ER 20x 20x 20x 10x   20x 20x 20x  20x 20x   Standing ABD 15x + scaption 15x + scaption 15x + scaption 15x + scaption    10x 10x 10x + scaption   Supine Serratus Punch 10x 10x 10x 10x       10x   Supine ER/IR at 90 10x5\" 10x5\" D/C       10x 5\"   Ther Activity             Pulleys FLX and Scap 3' ea 3' ea 3' ea NT 3' ea 3' ea 3' ea 3' ea 3' ea 3' ea   Prone Row     20x 20x  20x 20x NT    Prone EXT     20x 20x 20x 20x NT    Table Slides FLX and Scap     10x ea  10x ea  10x ea 10x ea 10x ea D/C   Wall Slides 10x5\" 10x5\" 10x5\" NT    10x 5\" 10x5\" 10x5\"   Tband row and EXT 10x L2 ea 10x L2 ea 20xL2 ea 20x L2 ea      10x L2 ea   Tband ER/IR 10x L1  10x L2 20x L2 20x L2         Gait Training                                       Modalities                                                                    "

## 2024-12-26 ENCOUNTER — APPOINTMENT (OUTPATIENT)
Dept: PHYSICAL THERAPY | Facility: CLINIC | Age: 74
End: 2024-12-26
Payer: COMMERCIAL

## 2024-12-30 ENCOUNTER — APPOINTMENT (OUTPATIENT)
Dept: PHYSICAL THERAPY | Facility: CLINIC | Age: 74
End: 2024-12-30
Payer: COMMERCIAL

## 2024-12-31 ENCOUNTER — OFFICE VISIT (OUTPATIENT)
Dept: PHYSICAL THERAPY | Facility: CLINIC | Age: 74
End: 2024-12-31
Payer: COMMERCIAL

## 2024-12-31 DIAGNOSIS — G89.29 CHRONIC RIGHT SHOULDER PAIN: ICD-10-CM

## 2024-12-31 DIAGNOSIS — M25.511 CHRONIC RIGHT SHOULDER PAIN: ICD-10-CM

## 2024-12-31 DIAGNOSIS — Z09 FOLLOW-UP EXAMINATION, FOLLOWING OTHER SURGERY: Primary | ICD-10-CM

## 2024-12-31 PROCEDURE — 97530 THERAPEUTIC ACTIVITIES: CPT

## 2024-12-31 PROCEDURE — 97110 THERAPEUTIC EXERCISES: CPT

## 2024-12-31 PROCEDURE — 97140 MANUAL THERAPY 1/> REGIONS: CPT

## 2024-12-31 NOTE — PROGRESS NOTES
"Daily Note     Today's date: 2024  Patient name: Serenity Anaya  : 1950  MRN: [id number]  Referring provider: Tiffanie Pa PA-C  Dx:   Encounter Diagnosis     ICD-10-CM    1. Follow-up examination, following other surgery  Z09       2. Chronic right shoulder pain  M25.511     G89.29           Start Time: 1430  Stop Time: 1515  Total time in clinic (min): 45 minutes    Subjective: Patient reports her shoulder is feeling better.  She reports it is a little sore from changing a light bulb yesterday.  Patient feels she is able to lift her arm higher and is able to reach behind her back more.      Objective: See treatment diary below      Assessment: Tolerated treatment well. Patient would benefit from continued PT.  Patient tolerated exercises with greater ease today.  Patient is still limited based on her protocol.  Patient struggles with AROM.  AAROM exercises are doing better.  Patient is experiencing less pain but pain is mainly focused around area of repair.  Will progress patient with strengthening exercises as tolerated and according to protocol.      Plan: Continue per plan of care.  Progress treatment as tolerated.       Precautions: No lifting # with R arm RCR 10/8      Manuals  RE   PROM R shoulder FLX, ABD, ER, IR 10' AT 10' AT 10' AT 10' AT   10' AT  + STM 10' AT +STM 10' AT 10' AT 10' AT 10' AT                                          Neuro Re-Ed                                                                                                        Ther Ex             Scap Ret       20x5\" 20x5\" HEP     IR stretch w/ strap   10x5\" 10x5\" 10x5\"        Elbow FLX/EXT             Supine Shoulder FLX AAROM 10x  Patient requested no weight 10x Patient requested no weight NT too much pain 10x no weight + BP 10x no weight +BP 10x 10x  10x  10x 10x 2# weight on stick   Wrist Circumduction             Forearm Pronation             Supine " "ER and IR with cane       20x5\" 20x 5\" 20x5\" 20x5\" D/C   S/L ER 20x 20x 20x 10x  NT 20x 20x 20x  20x 20x   Standing ABD 15x + scaption 15x + scaption 15x + scaption 15x + scaption 15x + scaption   10x 10x 10x + scaption   Supine Serratus Punch 10x 10x 10x 10x  10x     10x   Supine ER/IR at 90 10x5\" 10x5\" D/C       10x 5\"   Ther Activity             Pulleys FLX and Scap 3' ea 3' ea 3' ea NT 3' ea 3' ea 3' ea 3' ea 3' ea 3' ea   Prone Row      20x  20x 20x NT    Prone EXT      20x 20x 20x NT    Table Slides FLX and Scap      10x ea  10x ea 10x ea 10x ea D/C   Wall Slides 10x5\" 10x5\" 10x5\" NT    10x 5\" 10x5\" 10x5\"   Tband row and EXT 10x L2 ea 10x L2 ea 20xL2 ea 20x L2 ea 20x L2 ea      10x L2 ea   Tband ER/IR 10x L1  10x L2 20x L2 20x L2 20x L2        Gait Training                                       Modalities                                                                      "

## 2025-01-02 ENCOUNTER — APPOINTMENT (OUTPATIENT)
Dept: PHYSICAL THERAPY | Facility: CLINIC | Age: 75
End: 2025-01-02
Payer: COMMERCIAL

## 2025-01-06 ENCOUNTER — OFFICE VISIT (OUTPATIENT)
Dept: PHYSICAL THERAPY | Facility: CLINIC | Age: 75
End: 2025-01-06
Payer: COMMERCIAL

## 2025-01-06 DIAGNOSIS — M25.511 CHRONIC RIGHT SHOULDER PAIN: ICD-10-CM

## 2025-01-06 DIAGNOSIS — G89.29 CHRONIC RIGHT SHOULDER PAIN: ICD-10-CM

## 2025-01-06 DIAGNOSIS — Z09 FOLLOW-UP EXAMINATION, FOLLOWING OTHER SURGERY: Primary | ICD-10-CM

## 2025-01-06 PROCEDURE — 97530 THERAPEUTIC ACTIVITIES: CPT

## 2025-01-06 PROCEDURE — 97140 MANUAL THERAPY 1/> REGIONS: CPT

## 2025-01-06 PROCEDURE — 97110 THERAPEUTIC EXERCISES: CPT

## 2025-01-06 NOTE — PROGRESS NOTES
"Daily Note     Today's date: 2025  Patient name: Serenity Anaya  : 1950  MRN: [id number]  Referring provider: Tiffanie Pa PA-C  Dx:   Encounter Diagnosis     ICD-10-CM    1. Follow-up examination, following other surgery  Z09       2. Chronic right shoulder pain  M25.511     G89.29                      Subjective: Patient reports that she still sleeps with her R UE in the sling do to positional issues. She said she is trying to use her R UE more functionally.       Objective: See treatment diary below.       Assessment: Tolerated treatment well. Patient would benefit from continued PT to increase R shoulder ROM, strength, and stability. She tolerated program today. Strength limitations are present in all planes. Will progress as per tolerated.      Plan: Continue per plan of care.      Precautions: No lifting # with R arm RCR 10/8      Manuals  12 RE   PROM R shoulder FLX, ABD, ER, IR 10' AT 10' AT 10' AT 10' AT   10' AT  + STM CM 10' AT 10' AT 10' AT 10' AT   STM      CM                                 Neuro Re-Ed                                                                                                        Ther Ex             Scap Ret        20x5\" HEP     IR stretch w/ strap   10x5\" 10x5\" 10x5\" 5\"x10       Supine Shoulder FLX AAROM 10x  Patient requested no weight 10x Patient requested no weight NT too much pain 10x no weight + BP 10x no weight +BP 15x 10x  10x  10x 10x 2# weight on stick   S/L ER 20x 20x 20x 10x  NT 2x10 20x 20x  20x 20x   Standing ABD/Scap 15x + scaption 15x + scaption 15x + scaption 15x + scaption 15x + scaption 15x ea  10x 10x 10x + scaption   Supine Serratus Punch 10x 10x 10x 10x  10x 10x     10x   Supine ER/IR at 90 10x5\" 10x5\" D/C       10x 5\"   Ther Activity             Pulleys FLX and Scap 3' ea 3' ea 3' ea NT 3' ea 3'/3' 3' ea 3' ea 3' ea 3' ea   Prone Row       20x 20x NT    Prone EXT       20x 20x NT  " "  Stick abd      5\"x10       Wall Slides 10x5\" 10x5\" 10x5\" NT  FF 11x  10x 5\" 10x5\" 10x5\"   Tband row and EXT 10x L2 ea 10x L2 ea 20xL2 ea 20x L2 ea 20x L2 ea  L2 2x10 ea    10x L2 ea   Tband ER/IR 10x L1  10x L2 20x L2 20x L2 20x L2 L2 IR  L1 ER  2x10       Gait Training                                       Modalities                                                                        "

## 2025-01-09 ENCOUNTER — APPOINTMENT (OUTPATIENT)
Dept: PHYSICAL THERAPY | Facility: CLINIC | Age: 75
End: 2025-01-09
Payer: COMMERCIAL

## 2025-01-14 ENCOUNTER — EVALUATION (OUTPATIENT)
Dept: PHYSICAL THERAPY | Facility: CLINIC | Age: 75
End: 2025-01-14
Payer: COMMERCIAL

## 2025-01-14 DIAGNOSIS — M25.511 CHRONIC RIGHT SHOULDER PAIN: ICD-10-CM

## 2025-01-14 DIAGNOSIS — G89.29 CHRONIC RIGHT SHOULDER PAIN: ICD-10-CM

## 2025-01-14 DIAGNOSIS — Z09 FOLLOW-UP EXAMINATION, FOLLOWING OTHER SURGERY: Primary | ICD-10-CM

## 2025-01-14 PROCEDURE — 97110 THERAPEUTIC EXERCISES: CPT | Performed by: PHYSICAL THERAPIST

## 2025-01-14 PROCEDURE — 97140 MANUAL THERAPY 1/> REGIONS: CPT | Performed by: PHYSICAL THERAPIST

## 2025-01-14 PROCEDURE — 97530 THERAPEUTIC ACTIVITIES: CPT | Performed by: PHYSICAL THERAPIST

## 2025-01-14 NOTE — PROGRESS NOTES
PT Re-Evaluation     Today's date: 2025  Patient name: Serenity Anaya  : 1950  MRN: [id number]  Referring provider: Tiffanie Pa PA-C  Dx:   Encounter Diagnosis     ICD-10-CM    1. Follow-up examination, following other surgery  Z09       2. Chronic right shoulder pain  M25.511     G89.29             Start Time: 1100  Stop Time: 1200  Total time in clinic (min): 60 minutes    Assessment  Impairments: abnormal muscle firing, abnormal or restricted ROM, activity intolerance, impaired physical strength, pain with function, scapular dyskinesis, poor posture  and poor body mechanics    Assessment details: The patient is responding well to physical therapy treatment with gains noted in R shoulder ROM and strength. She continues to experience pain in the R shoulder and upper arm rates 4/10 on average and 7-8/10 at worst. She notes that she is able to reach forward better but has shoulder pain. She continues to have difficulty lifting things overhead. Deficits remain in R shoulder strength and stability. She would benefit from continued therapy to address ongoing deficits and to further improve functional use of the R UE.     Goals  STG 1  Patient will decrease shoulder pain to 2/10 at worst to improve tolerance to therapy in 4 weeks. ONGOING    STG 2  Patient will report 50% improvement in overall condition in 4 weeks to display improved quality of life. MET    STG 3  Patient will display ROM of 90 degrees active shoulder elevation to display improved functional mobility in 4 weeks. MET      LTG 1 Patient will report no pain in shoulder or elbow to display improved ability to perform ADLs in 12 weeks.  ONGOING    LTG 2  Patient will display 5/5 RUE strength to display improved functional reaching and lifting in12 weeks. ONGOING    Plan  Patient would benefit from: skilled physical therapy  Planned modality interventions: TENS, thermotherapy: hydrocollator packs and cryotherapy    Planned therapy  interventions: IASTM, joint mobilization, manual therapy, kinesiology taping, nerve gliding, patient education, postural training, strengthening, stretching, therapeutic activities, therapeutic exercise, home exercise program, functional ROM exercises, flexibility and ADL training    Frequency: 2x week  Duration in weeks: 4  Plan of Care beginning date: 1/14/2025  Plan of Care expiration date: 2/11/2025  Treatment plan discussed with: patient        Subjective Evaluation    History of Present Illness  Date of onset: 6/1/2024  Date of surgery: 10/8/2024  Mechanism of injury: surgery  Mechanism of injury: Patient had R shoulder pain which she was initially treated with PT.  Patient hurt her shoulder trying to pull down the glass in her storm door.  Patient had a rotator cuff repair and biceps tenotomy on 10/8.  She did her exercises initially at home using the FORCE agata provided by her surgeon.  She is currently to lift no weight and protocol was provided.  Patient reports she is having a lot of pain in the shoulder and radiating down into the elbow.  Patient is unable to lift the arm much actively.    Update 12/5:  Patient reports 55% improvement overall since beginning therapy.  Patient still reports high levels of pain.  Patient reports a lot of difficulty reaching behind back and still has a lot of difficulty using that arm for ADLs.      UPDATE 1/14/25:  The patient reports 4/10 pain in the R elbow region. She notes increased pain last night in the R shoulder, rated 8-9/10. She used ice and ibuprofen to reduce the pain. She states that she can reach forward fairly well but reaching to the side remains difficult. She sees her surgeon at the end of January.   Patient Goals  Patient goals for therapy: decreased pain, decreased edema, increased strength, independence with ADLs/IADLs, increased motion and return to sport/leisure activities    Hand dominance: left      Diagnostic Tests  MRI studies:  "abnormal  Treatments  Previous treatment: immobilization and physical therapy  Current treatment: physical therapy        Objective     General Comments:      Shoulder Comments       Shoulder AROM R:  FLX: 155 (painful arc )  ABD: 140  ER: 65  IR: 45    UE MMT:      Shoulder R           FLX: 4           ABD: 4-           ER: 4-           IR: 4+                              Precautions: No lifting # with R arm RCR 10/8      Manuals 12/9 12/12 12/19 12/23 12/31 1/6 1/14 11/21 11/27 12/5 RE   PROM R shoulder FLX, ABD, ER, IR 10' AT 10' AT 10' AT 10' AT   10' AT  + STM CM JM 10' AT 10' AT 10' AT   STM      CM                                 Neuro Re-Ed                                                                                                        Ther Ex             Scap Ret         HEP     IR stretch w/ strap   10x5\" 10x5\" 10x5\" 5\"x10 5\"x10      Supine Shoulder FLX AAROM 10x  Patient requested no weight 10x Patient requested no weight NT too much pain 10x no weight + BP 10x no weight +BP 15x Flex+BP  10x ea  (Try 2# NV) 10x  10x 10x 2# weight on stick   S/L ER 20x 20x 20x 10x  NT 2x10 2x10 20x  20x 20x   Standing ABD/Scap 15x + scaption 15x + scaption 15x + scaption 15x + scaption 15x + scaption 15x ea 15x 10x 10x 10x + scaption   Supine Serratus Punch 10x 10x 10x 10x  10x 10x  10x   10x   Supine ER/IR at 90 10x5\" 10x5\" D/C       10x 5\"   Ther Activity             Pulleys FLX and Scap 3' ea 3' ea 3' ea NT 3' ea 3'/3' 3' ea 3' ea 3' ea 3' ea   Prone Row        20x NT    Prone EXT        20x NT    Stick abd      5\"x10       Wall Slides 10x5\" 10x5\" 10x5\" NT  FF 11x 15x5\" 10x 5\" 10x5\" 10x5\"   Tband row and EXT 10x L2 ea 10x L2 ea 20xL2 ea 20x L2 ea 20x L2 ea  L2 2x10 ea L2 2x10 ea   10x L2 ea   Tband ER/IR 10x L1  10x L2 20x L2 20x L2 20x L2 L2 IR  L1 ER  2x10 L2 20x ea       Gait Training                                       Modalities                                                            "

## 2025-01-14 NOTE — LETTER
2025    Tiffanie Pa PA-C  [address] [place] [id number]    Patient: Serenity Anaya   YOB: 1950   Date of Visit: 2025     Encounter Diagnosis     ICD-10-CM    1. Follow-up examination, following other surgery  Z09       2. Chronic right shoulder pain  M25.511     G89.29           Dear Dr. Pa:    Thank you for your recent referral of Serenity Anaya. Please review the attached evaluation summary from Serenity's recent visit.     Please verify that you agree with the plan of care by signing the attached order.     If you have any questions or concerns, please do not hesitate to call.     I sincerely appreciate the opportunity to share in the care of one of your patients and hope to have another opportunity to work with you in the near future.       Sincerely,    Lee Ann Jain, PT      Referring Provider:      I certify that I have read the below Plan of Care and certify the need for these services furnished under this plan of treatment while under my care.                    Tiffanie Pa PA-C  [address] [place] [id number]  Via Fax: [phone]          PT Re-Evaluation     Today's date: 2025  Patient name: Serenity Anaya  : 1950  MRN: [id number]  Referring provider: Tiffanie Pa PA-C  Dx:   Encounter Diagnosis     ICD-10-CM    1. Follow-up examination, following other surgery  Z09       2. Chronic right shoulder pain  M25.511     G89.29             Start Time: 1100  Stop Time: 1200  Total time in clinic (min): 60 minutes    Assessment  Impairments: abnormal muscle firing, abnormal or restricted ROM, activity intolerance, impaired physical strength, pain with function, scapular dyskinesis, poor posture  and poor body mechanics    Assessment details: The patient is responding well to physical therapy treatment with gains noted in R shoulder ROM and strength. She continues to experience pain in the R shoulder and  upper arm rates 4/10 on average and 7-8/10 at worst. She notes that she is able to reach forward better but has shoulder pain. She continues to have difficulty lifting things overhead. Deficits remain in R shoulder strength and stability. She would benefit from continued therapy to address ongoing deficits and to further improve functional use of the R UE.     Goals  STG 1  Patient will decrease shoulder pain to 2/10 at worst to improve tolerance to therapy in 4 weeks. ONGOING    STG 2  Patient will report 50% improvement in overall condition in 4 weeks to display improved quality of life. MET    STG 3  Patient will display ROM of 90 degrees active shoulder elevation to display improved functional mobility in 4 weeks. MET      LTG 1 Patient will report no pain in shoulder or elbow to display improved ability to perform ADLs in 12 weeks.  ONGOING    LTG 2  Patient will display 5/5 RUE strength to display improved functional reaching and lifting in12 weeks. ONGOING    Plan  Patient would benefit from: skilled physical therapy  Planned modality interventions: TENS, thermotherapy: hydrocollator packs and cryotherapy    Planned therapy interventions: IASTM, joint mobilization, manual therapy, kinesiology taping, nerve gliding, patient education, postural training, strengthening, stretching, therapeutic activities, therapeutic exercise, home exercise program, functional ROM exercises, flexibility and ADL training    Frequency: 2x week  Duration in weeks: 4  Plan of Care beginning date: 1/14/2025  Plan of Care expiration date: 2/11/2025  Treatment plan discussed with: patient        Subjective Evaluation    History of Present Illness  Date of onset: 6/1/2024  Date of surgery: 10/8/2024  Mechanism of injury: surgery  Mechanism of injury: Patient had R shoulder pain which she was initially treated with PT.  Patient hurt her shoulder trying to pull down the glass in her storm door.  Patient had a rotator cuff repair and  biceps tenotomy on 10/8.  She did her exercises initially at home using the FOR[employer] agata provided by her surgeon.  She is currently to lift no weight and protocol was provided.  Patient reports she is having a lot of pain in the shoulder and radiating down into the elbow.  Patient is unable to lift the arm much actively.    Update 12/5:  Patient reports 55% improvement overall since beginning therapy.  Patient still reports high levels of pain.  Patient reports a lot of difficulty reaching behind back and still has a lot of difficulty using that arm for ADLs.      UPDATE 1/14/25:  The patient reports 4/10 pain in the R elbow region. She notes increased pain last night in the R shoulder, rated 8-9/10. She used ice and ibuprofen to reduce the pain. She states that she can reach forward fairly well but reaching to the side remains difficult. She sees her surgeon at the end of January.   Patient Goals  Patient goals for therapy: decreased pain, decreased edema, increased strength, independence with ADLs/IADLs, increased motion and return to sport/leisure activities    Hand dominance: left      Diagnostic Tests  MRI studies: abnormal  Treatments  Previous treatment: immobilization and physical therapy  Current treatment: physical therapy        Objective     General Comments:      Shoulder Comments       Shoulder AROM R:  FLX: 155 (painful arc )  ABD: 140  ER: 65  IR: 45    UE MMT:      Shoulder R           FLX: 4           ABD: 4-           ER: 4-           IR: 4+                              Precautions: No lifting # with R arm RCR 10/8      Manuals 12/9 12/12 12/19 12/23 12/31 1/6 1/14 11/21 11/27 12/5 RE   PROM R shoulder FLX, ABD, ER, IR 10' AT 10' AT 10' AT 10' AT   10' AT  + STM CM JM 10' AT 10' AT 10' AT   STM      CM                                 Neuro Re-Ed                                                                                                        Ther Ex             Scap Ret         HEP     IR  "stretch w/ strap   10x5\" 10x5\" 10x5\" 5\"x10 5\"x10      Supine Shoulder FLX AAROM 10x  Patient requested no weight 10x Patient requested no weight NT too much pain 10x no weight + BP 10x no weight +BP 15x Flex+BP  10x ea  (Try 2# NV) 10x  10x 10x 2# weight on stick   S/L ER 20x 20x 20x 10x  NT 2x10 2x10 20x  20x 20x   Standing ABD/Scap 15x + scaption 15x + scaption 15x + scaption 15x + scaption 15x + scaption 15x ea 15x 10x 10x 10x + scaption   Supine Serratus Punch 10x 10x 10x 10x  10x 10x  10x   10x   Supine ER/IR at 90 10x5\" 10x5\" D/C       10x 5\"   Ther Activity             Pulleys FLX and Scap 3' ea 3' ea 3' ea NT 3' ea 3'/3' 3' ea 3' ea 3' ea 3' ea   Prone Row        20x NT    Prone EXT        20x NT    Stick abd      5\"x10       Wall Slides 10x5\" 10x5\" 10x5\" NT  FF 11x 15x5\" 10x 5\" 10x5\" 10x5\"   Tband row and EXT 10x L2 ea 10x L2 ea 20xL2 ea 20x L2 ea 20x L2 ea  L2 2x10 ea L2 2x10 ea   10x L2 ea   Tband ER/IR 10x L1  10x L2 20x L2 20x L2 20x L2 L2 IR  L1 ER  2x10 L2 20x ea       Gait Training                                       Modalities                                                                            "

## 2025-01-16 ENCOUNTER — OFFICE VISIT (OUTPATIENT)
Dept: PHYSICAL THERAPY | Facility: CLINIC | Age: 75
End: 2025-01-16
Payer: COMMERCIAL

## 2025-01-16 DIAGNOSIS — M25.511 CHRONIC RIGHT SHOULDER PAIN: ICD-10-CM

## 2025-01-16 DIAGNOSIS — Z09 FOLLOW-UP EXAMINATION, FOLLOWING OTHER SURGERY: Primary | ICD-10-CM

## 2025-01-16 DIAGNOSIS — G89.29 CHRONIC RIGHT SHOULDER PAIN: ICD-10-CM

## 2025-01-16 PROCEDURE — 97110 THERAPEUTIC EXERCISES: CPT

## 2025-01-16 PROCEDURE — 97530 THERAPEUTIC ACTIVITIES: CPT

## 2025-01-16 PROCEDURE — 97140 MANUAL THERAPY 1/> REGIONS: CPT

## 2025-01-16 NOTE — PROGRESS NOTES
"Daily Note     Today's date: 2025  Patient name: Sereniyt Anaya  : 1950  MRN: [id number]  Referring provider: Tiffanie Pa PA-C  Dx:   Encounter Diagnosis     ICD-10-CM    1. Follow-up examination, following other surgery  Z09       2. Chronic right shoulder pain  M25.511     G89.29                      Subjective: Patient reports that she is using her R UE more. She is still limited with reaching above shoulder height.      Objective: See treatment diary below. Increased reps and incorporated resistance today.      Assessment: Tolerated treatment well. Patient would benefit from continued PT to increase R shoulder ROM, strength, and stability. She tolerated progression today. Strength limitations are present in all planes. Will progress as per tolerated.       Plan: Continue per plan of care.      Precautions: No lifting # with R arm RCR 10/8      Manuals    PROM R shoulder FLX, ABD, ER, IR CM 10' AT 10' AT 10' AT   10' AT  + STM CM JM   STM      CM                        Neuro Re-Ed                                                                                Ther Ex          IR stretch w/ strap 5\"x10  10x5\" 10x5\" 10x5\" 5\"x10 5\"x10   Supine Shoulder FLX AAROM 2# 2x10 10x Patient requested no weight NT too much pain 10x no weight + BP 10x no weight +BP 15x Flex+BP  10x ea  (Try 2# NV)   S/L ER 1# 10x 20x 20x 10x  NT 2x10 2x10   Standing ABD/Scap Flexion/abd 2x10 15x + scaption 15x + scaption 15x + scaption 15x + scaption 15x ea 15x   Supine Serratus Punch 1# 2x10 10x 10x 10x  10x 10x  10x   Supine ER/IR at 90  10x5\" D/C       Ther Activity          Pulleys FLX and Scap 3'/3' 3' ea 3' ea NT 3' ea 3'/3' 3' ea   Prone Row          Prone EXT          Stick abd      5\"x10    Wall Slides 5\"x20 10x5\" 10x5\" NT  FF 11x 15x5\"   Tband row and EXT L3 2x10 10x L2 ea 20xL2 ea 20x L2 ea 20x L2 ea  L2 2x10 ea L2 2x10 ea   Tband ER/IR L2 2x10 10x L2 20x L2 20x L2 20x L2 " L2 IR  L1 ER  2x10 L2 20x ea    Gait Training                              Modalities

## 2025-01-21 ENCOUNTER — OFFICE VISIT (OUTPATIENT)
Dept: PHYSICAL THERAPY | Facility: CLINIC | Age: 75
End: 2025-01-21
Payer: COMMERCIAL

## 2025-01-21 DIAGNOSIS — M25.511 CHRONIC RIGHT SHOULDER PAIN: Primary | ICD-10-CM

## 2025-01-21 DIAGNOSIS — G89.29 CHRONIC RIGHT SHOULDER PAIN: Primary | ICD-10-CM

## 2025-01-21 DIAGNOSIS — Z09 FOLLOW-UP EXAMINATION, FOLLOWING OTHER SURGERY: ICD-10-CM

## 2025-01-21 PROCEDURE — 97530 THERAPEUTIC ACTIVITIES: CPT | Performed by: PHYSICAL THERAPIST

## 2025-01-21 PROCEDURE — 97110 THERAPEUTIC EXERCISES: CPT | Performed by: PHYSICAL THERAPIST

## 2025-01-21 PROCEDURE — 97140 MANUAL THERAPY 1/> REGIONS: CPT | Performed by: PHYSICAL THERAPIST

## 2025-01-21 NOTE — PROGRESS NOTES
"Daily Note     Today's date: 2025  Patient name: Serenity Anaya  : 1950  MRN: [id number]  Referring provider: Tiffanie Pa PA-C  Dx:   Encounter Diagnosis     ICD-10-CM    1. Chronic right shoulder pain  M25.511     G89.29       2. Follow-up examination, following other surgery  Z09           Start Time: 1100  Stop Time: 1200  Total time in clinic (min): 60 minutes    Subjective: The patient reports stiffness in the R shoulder today due to shoveling snow yesterday. She notes that she is able to reach into her upper kitchen cabinets now.       Objective: See treatment diary below      Assessment: Patient noted mildly increased pain in the R shoulder with scaption and resisted ER. Occasional rest periods needed between sets today. Tolerated treatment well. Patient would benefit from continued PT      Plan: Continue per plan of care.      Precautions: No lifting # with R arm RCR 10/8      Manuals    PROM R shoulder FLX, ABD, ER, IR CM JM 10' AT 10' AT   10' AT  + STM CM JM   STM      CM                        Neuro Re-Ed                                                                                Ther Ex          IR stretch w/ strap 5\"x10 5\"x10 10x5\" 10x5\" 10x5\" 5\"x10 5\"x10   Supine Shoulder FLX AAROM 2# 2x10 2# 10x ea NT too much pain 10x no weight + BP 10x no weight +BP 15x Flex+BP  10x ea  (Try 2# NV)   S/L ER 1# 10x Held p! 20x 10x  NT 2x10 2x10   Standing ABD/Scap Flexion/abd 2x10 Flexion/abd 2x10 15x + scaption 15x + scaption 15x + scaption 15x ea 15x   Supine Serratus Punch 1# 2x10 1# 10x 10x 10x  10x 10x  10x   Supine ER/IR at 90   D/C       Ther Activity          Pulleys FLX and Scap 3'/3' 3' ea 3' ea NT 3' ea 3'/3' 3' ea   Prone Row          Prone EXT          Stick abd      5\"x10    Wall Slides 5\"x20 5\"x20 10x5\" NT  FF 11x 15x5\"   Tband row and EXT L3 2x10 L3 2x10 ea 20xL2 ea 20x L2 ea 20x L2 ea  L2 2x10 ea L2 2x10 ea   Tband ER/IR L2 2x10 L2 2x10 " 20x L2 20x L2 20x L2 L2 IR  L1 ER  2x10 L2 20x ea    Gait Training                              Modalities

## 2025-01-23 ENCOUNTER — OFFICE VISIT (OUTPATIENT)
Dept: PHYSICAL THERAPY | Facility: CLINIC | Age: 75
End: 2025-01-23
Payer: COMMERCIAL

## 2025-01-23 ENCOUNTER — HOSPITAL ENCOUNTER (OUTPATIENT)
Dept: MRI IMAGING | Facility: HOSPITAL | Age: 75
End: 2025-01-23
Payer: COMMERCIAL

## 2025-01-23 DIAGNOSIS — S09.90XA UNSPECIFIED INJURY OF HEAD, INITIAL ENCOUNTER: ICD-10-CM

## 2025-01-23 DIAGNOSIS — S19.9XXA UNSPECIFIED INJURY OF NECK, INITIAL ENCOUNTER: ICD-10-CM

## 2025-01-23 DIAGNOSIS — M50.20 OTHER CERVICAL DISC DISPLACEMENT, UNSPECIFIED CERVICAL REGION: ICD-10-CM

## 2025-01-23 DIAGNOSIS — M54.12 RADICULOPATHY, CERVICAL REGION: ICD-10-CM

## 2025-01-23 DIAGNOSIS — M25.511 CHRONIC RIGHT SHOULDER PAIN: Primary | ICD-10-CM

## 2025-01-23 DIAGNOSIS — Z09 FOLLOW-UP EXAMINATION, FOLLOWING OTHER SURGERY: ICD-10-CM

## 2025-01-23 DIAGNOSIS — M50.30 OTHER CERVICAL DISC DEGENERATION, UNSPECIFIED CERVICAL REGION: ICD-10-CM

## 2025-01-23 DIAGNOSIS — G89.29 CHRONIC RIGHT SHOULDER PAIN: Primary | ICD-10-CM

## 2025-01-23 PROCEDURE — 97140 MANUAL THERAPY 1/> REGIONS: CPT

## 2025-01-23 PROCEDURE — 72141 MRI NECK SPINE W/O DYE: CPT

## 2025-01-23 PROCEDURE — 97530 THERAPEUTIC ACTIVITIES: CPT

## 2025-01-23 PROCEDURE — 97110 THERAPEUTIC EXERCISES: CPT

## 2025-01-23 NOTE — PROGRESS NOTES
"Daily Note     Today's date: 2025  Patient name: Serenity Anaya  : 1950  MRN: [id number]  Referring provider: Tiffanie Pa PA-C  Dx:   Encounter Diagnosis     ICD-10-CM    1. Chronic right shoulder pain  M25.511     G89.29       2. Follow-up examination, following other surgery  Z09                      Subjective: patient reports that she was wiping glass in a circular motion and her R arm had fatigue during.      Objective: See treatment diary below. Incorporated wall ball into program today.      Assessment: Tolerated treatment well. Patient demonstrated fatigue post treatment and would benefit from continued PT to improve R shoulder ROM, strength, and stability to perform functional activities for longer periods of time. Patient's PROM has improved. Her strength and endurance in all planes still has deficits at this time. Progress to tolerance.       Plan: Continue per plan of care.      Precautions: No lifting # with R arm RCR 10      Manuals    PROM R shoulder FLX, ABD, ER, IR CM JM CM 10' AT   10' AT  + STM CM JM   STM      CM                        Neuro Re-Ed                                                  Ther Ex          IR stretch w/ strap 5\"x10 5\"x10 5\"x10 10x5\" 10x5\" 5\"x10 5\"x10   Supine Shoulder FLX AAROM 2# 2x10 2# 10x ea 2# 10x  10x no weight + BP 10x no weight +BP 15x Flex+BP  10x ea  (Try 2# NV)   S/L ER 1# 10x Held p!  10x  NT 2x10 2x10   Standing ABD/Scap Flexion/abd 2x10 Flexion/abd 2x10 Flexion/abd 2x10 15x + scaption 15x + scaption 15x ea 15x   Supine Serratus Punch 1# 2x10 1# 10x 1# 10x  10x  10x 10x  10x   Supine ER/IR at 90          Ther Activity          Pulleys FLX and Scap 3'/3' 3' ea 3' ea NT 3' ea 3'/3' 3' ea   Prone Row          Prone EXT          Wall Ball CW/CCW   20x ea       Wall Slides 5\"x20 5\"x20 5\"x20 NT  FF 11x 15x5\"   Tband row and EXT L3 2x10 L3 2x10 ea L3 2x10 ea 20x L2 ea 20x L2 ea  L2 2x10 ea L2 2x10 ea   Tband " ER/IR L2 2x10 L2 2x10 L3 2x10 20x L2 20x L2 L2 IR  L1 ER  2x10 L2 20x ea    Gait Training                              Modalities

## 2025-01-28 ENCOUNTER — OFFICE VISIT (OUTPATIENT)
Dept: PHYSICAL THERAPY | Facility: CLINIC | Age: 75
End: 2025-01-28
Payer: COMMERCIAL

## 2025-01-28 ENCOUNTER — APPOINTMENT (OUTPATIENT)
Dept: PHYSICAL THERAPY | Facility: CLINIC | Age: 75
End: 2025-01-28
Payer: COMMERCIAL

## 2025-01-28 DIAGNOSIS — G89.29 CHRONIC RIGHT SHOULDER PAIN: Primary | ICD-10-CM

## 2025-01-28 DIAGNOSIS — M25.511 CHRONIC RIGHT SHOULDER PAIN: Primary | ICD-10-CM

## 2025-01-28 DIAGNOSIS — Z09 FOLLOW-UP EXAMINATION, FOLLOWING OTHER SURGERY: ICD-10-CM

## 2025-01-28 PROCEDURE — 97530 THERAPEUTIC ACTIVITIES: CPT | Performed by: PHYSICAL THERAPIST

## 2025-01-28 PROCEDURE — 97140 MANUAL THERAPY 1/> REGIONS: CPT | Performed by: PHYSICAL THERAPIST

## 2025-01-28 PROCEDURE — 97110 THERAPEUTIC EXERCISES: CPT | Performed by: PHYSICAL THERAPIST

## 2025-01-28 NOTE — PROGRESS NOTES
"Daily Note     Today's date: 2025  Patient name: Serenity Anaya  : 1950  MRN: [id number]  Referring provider: Tiffanie Pa PA-C  Dx:   Encounter Diagnosis     ICD-10-CM    1. Chronic right shoulder pain  M25.511     G89.29       2. Follow-up examination, following other surgery  Z09           Start Time: 1515  Stop Time: 1615  Total time in clinic (min): 60 minutes    Subjective: The patient reports 6/10 pain in the R shoulder. She was fixing a leak under her kitchen sink yesterday.       Objective: See treatment diary below      Assessment: Forward flexion painful today. Patient instructed to perform all exercises within pain free range. Patient notes muscle fatigue after therabnad exercises. Tolerated treatment well. Patient would benefit from continued PT      Plan: Continue per plan of care.      Precautions: No lifting # with R arm RCR 10/8      Manuals    PROM R shoulder FLX, ABD, ER, IR CM JM CM JM   10' AT  + STM CM JM   STM      CM                        Neuro Re-Ed                                                  Ther Ex          IR stretch w/ strap 5\"x10 5\"x10 5\"x10  10x5\" 5\"x10 5\"x10   Supine Shoulder FLX AAROM 2# 2x10 2# 10x ea 2# 10x  2# 10x+BP 10x no weight +BP 15x Flex+BP  10x ea  (Try 2# NV)   S/L ER 1# 10x Held p!   NT 2x10 2x10   Standing ABD/Scap Flexion/abd 2x10 Flexion/abd 2x10 Flexion/abd 2x10 Flex/Scap  2x10 15x + scaption 15x ea 15x   Supine Serratus Punch 1# 2x10 1# 10x 1# 10x  1# 10x  10x 10x  10x   Supine ER/IR at 90          Ther Activity          Pulleys FLX and Scap 3'/3' 3' ea 3' ea 3' ea 3' ea 3'/3' 3' ea   Prone Row          Prone EXT          Wall Ball CW/CCW   20x ea 20x ea      Wall Slides 5\"x20 5\"x20 5\"x20 5\"x20  FF 11x 15x5\"   Tband row and EXT L3 2x10 L3 2x10 ea L3 2x10 ea 20x L3 ea 20x L2 ea  L2 2x10 ea L2 2x10 ea   Tband ER/IR L2 2x10 L2 2x10 L3 2x10 20x L3 20x L2 L2 IR  L1 ER  2x10 L2 20x ea    Gait Training           "                    Modalities

## 2025-01-30 ENCOUNTER — OFFICE VISIT (OUTPATIENT)
Dept: PHYSICAL THERAPY | Facility: CLINIC | Age: 75
End: 2025-01-30
Payer: COMMERCIAL

## 2025-01-30 DIAGNOSIS — M25.511 CHRONIC RIGHT SHOULDER PAIN: Primary | ICD-10-CM

## 2025-01-30 DIAGNOSIS — G89.29 CHRONIC RIGHT SHOULDER PAIN: Primary | ICD-10-CM

## 2025-01-30 DIAGNOSIS — Z09 FOLLOW-UP EXAMINATION, FOLLOWING OTHER SURGERY: ICD-10-CM

## 2025-01-30 PROCEDURE — 97110 THERAPEUTIC EXERCISES: CPT

## 2025-01-30 PROCEDURE — 97140 MANUAL THERAPY 1/> REGIONS: CPT

## 2025-01-30 PROCEDURE — 97530 THERAPEUTIC ACTIVITIES: CPT

## 2025-01-30 NOTE — PROGRESS NOTES
"Daily Note     Today's date: 2025  Patient name: Serenity Anaya  : 1950  MRN: [id number]  Referring provider: Tiffanie Pa PA-C  Dx:   Encounter Diagnosis     ICD-10-CM    1. Chronic right shoulder pain  M25.511     G89.29       2. Follow-up examination, following other surgery  Z09                      Subjective: Patient reports that her R shoulder has stiffness today. Her pain and tightness in near armpit and superior shoulder.      Objective: See treatment diary below. Patient self terminated towel flexion due to pain.      Assessment: Tolerated treatment well. Patient demonstrated fatigue post treatment and would benefit from continued PT to improve R shoulder ROM, strength, and stability to perform functional activities for longer periods of time. Patient's PROM has improved. Her strength and endurance in all planes still has deficits at this time. Progress to tolerance.       Plan: Continue per plan of care.      Precautions: No lifting # with R arm RCR 10/8      Manuals    PROM R shoulder FLX, ABD, ER, IR CM JM CM JM   CM CM JM   STM      CM                        Neuro Re-Ed                                                  Ther Ex          IR stretch w/ strap 5\"x10 5\"x10 5\"x10  5\"x10 5\"x10 5\"x10   Supine Shoulder FLX AAROM 2# 2x10 2# 10x ea 2# 10x  2# 10x+BP NT 15x Flex+BP  10x ea  (Try 2# NV)   S/L ER 1# 10x Held p!    2x10 2x10   Standing ABD/Scap Flexion/abd 2x10 Flexion/abd 2x10 Flexion/abd 2x10 Flex/Scap  2x10 Flex/Scap  10x 15x ea 15x   Supine Serratus Punch 1# 2x10 1# 10x 1# 10x  1# 10x  1# 2x10 10x  10x   Supine ER/IR at 90          Ther Activity          Pulleys FLX and Scap 3'/3' 3' ea 3' ea 3' ea 3' ea 3'/3' 3' ea   Prone Row          Prone EXT          Wall Ball CW/CCW   20x ea 20x ea 10x ea     Wall Slides 5\"x20 5\"x20 5\"x20 5\"x20 5\"x7 FF 11x 15x5\"   Tband row and EXT L3 2x10 L3 2x10 ea L3 2x10 ea 20x L3 ea L3 2x10 L2 2x10 ea L2 2x10 ea "   Tband ER/IR L2 2x10 L2 2x10 L3 2x10 20x L3 L3 2x10 L2 IR  L1 ER  2x10 L2 20x ea    Gait Training                              Modalities

## 2025-06-17 DIAGNOSIS — R22.0 LOCALIZED SWELLING, MASS AND LUMP, HEAD: ICD-10-CM

## 2025-06-17 DIAGNOSIS — J01.90 ACUTE SINUSITIS, UNSPECIFIED: ICD-10-CM

## 2025-06-24 ENCOUNTER — HOSPITAL ENCOUNTER (OUTPATIENT)
Dept: CT IMAGING | Facility: HOSPITAL | Age: 75
Discharge: HOME | End: 2025-06-24
Attending: FAMILY MEDICINE
Payer: COMMERCIAL

## 2025-06-24 DIAGNOSIS — R22.0 LOCALIZED SWELLING, MASS AND LUMP, HEAD: ICD-10-CM

## 2025-06-24 DIAGNOSIS — J01.90 ACUTE SINUSITIS, UNSPECIFIED: ICD-10-CM

## 2025-06-24 PROCEDURE — 70486 CT MAXILLOFACIAL W/O DYE: CPT
